# Patient Record
Sex: FEMALE | Race: AMERICAN INDIAN OR ALASKA NATIVE | HISPANIC OR LATINO | URBAN - METROPOLITAN AREA
[De-identification: names, ages, dates, MRNs, and addresses within clinical notes are randomized per-mention and may not be internally consistent; named-entity substitution may affect disease eponyms.]

---

## 2019-01-28 ENCOUNTER — OUTPATIENT (OUTPATIENT)
Dept: OUTPATIENT SERVICES | Facility: HOSPITAL | Age: 19
LOS: 1 days | End: 2019-01-28

## 2019-01-28 DIAGNOSIS — Z00.00 ENCOUNTER FOR GENERAL ADULT MEDICAL EXAMINATION WITHOUT ABNORMAL FINDINGS: ICD-10-CM

## 2019-07-25 ENCOUNTER — RESULT REVIEW (OUTPATIENT)
Age: 19
End: 2019-07-25

## 2020-04-26 ENCOUNTER — MESSAGE (OUTPATIENT)
Age: 20
End: 2020-04-26

## 2020-05-22 ENCOUNTER — APPOINTMENT (OUTPATIENT)
Dept: DISASTER EMERGENCY | Facility: CLINIC | Age: 20
End: 2020-05-22

## 2020-10-16 ENCOUNTER — RESULT REVIEW (OUTPATIENT)
Age: 20
End: 2020-10-16

## 2021-10-11 ENCOUNTER — RESULT REVIEW (OUTPATIENT)
Age: 21
End: 2021-10-11

## 2022-02-08 ENCOUNTER — NON-APPOINTMENT (OUTPATIENT)
Age: 22
End: 2022-02-08

## 2022-02-08 ENCOUNTER — APPOINTMENT (OUTPATIENT)
Dept: FAMILY MEDICINE | Facility: CLINIC | Age: 22
End: 2022-02-08
Payer: COMMERCIAL

## 2022-02-08 VITALS
WEIGHT: 220 LBS | SYSTOLIC BLOOD PRESSURE: 112 MMHG | DIASTOLIC BLOOD PRESSURE: 71 MMHG | HEIGHT: 63 IN | BODY MASS INDEX: 38.98 KG/M2 | OXYGEN SATURATION: 97 % | HEART RATE: 100 BPM

## 2022-02-08 DIAGNOSIS — Z78.9 OTHER SPECIFIED HEALTH STATUS: ICD-10-CM

## 2022-02-08 DIAGNOSIS — Z13.220 ENCOUNTER FOR SCREENING FOR LIPOID DISORDERS: ICD-10-CM

## 2022-02-08 DIAGNOSIS — Z87.898 PERSONAL HISTORY OF OTHER SPECIFIED CONDITIONS: ICD-10-CM

## 2022-02-08 DIAGNOSIS — Z11.3 ENCOUNTER FOR SCREENING FOR INFECTIONS WITH A PREDOMINANTLY SEXUAL MODE OF TRANSMISSION: ICD-10-CM

## 2022-02-08 DIAGNOSIS — Z13.29 ENCOUNTER FOR SCREENING FOR OTHER SUSPECTED ENDOCRINE DISORDER: ICD-10-CM

## 2022-02-08 DIAGNOSIS — Z13.1 ENCOUNTER FOR SCREENING FOR DIABETES MELLITUS: ICD-10-CM

## 2022-02-08 DIAGNOSIS — Z13.21 ENCOUNTER FOR SCREENING FOR NUTRITIONAL DISORDER: ICD-10-CM

## 2022-02-08 DIAGNOSIS — Z82.49 FAMILY HISTORY OF ISCHEMIC HEART DISEASE AND OTHER DISEASES OF THE CIRCULATORY SYSTEM: ICD-10-CM

## 2022-02-08 PROCEDURE — 99385 PREV VISIT NEW AGE 18-39: CPT

## 2022-02-10 LAB
BASOPHILS # BLD AUTO: 0.04 K/UL
BASOPHILS NFR BLD AUTO: 0.5 %
EOSINOPHIL # BLD AUTO: 0.11 K/UL
EOSINOPHIL NFR BLD AUTO: 1.3 %
HCT VFR BLD CALC: 43.9 %
HGB BLD-MCNC: 14.3 G/DL
IMM GRANULOCYTES NFR BLD AUTO: 0.2 %
LYMPHOCYTES # BLD AUTO: 2.45 K/UL
LYMPHOCYTES NFR BLD AUTO: 30 %
MAN DIFF?: NORMAL
MCHC RBC-ENTMCNC: 28.7 PG
MCHC RBC-ENTMCNC: 32.6 GM/DL
MCV RBC AUTO: 88.2 FL
MONOCYTES # BLD AUTO: 0.51 K/UL
MONOCYTES NFR BLD AUTO: 6.2 %
NEUTROPHILS # BLD AUTO: 5.05 K/UL
NEUTROPHILS NFR BLD AUTO: 61.8 %
PLATELET # BLD AUTO: 330 K/UL
RBC # BLD: 4.98 M/UL
RBC # FLD: 13 %
WBC # FLD AUTO: 8.18 K/UL

## 2022-02-11 ENCOUNTER — NON-APPOINTMENT (OUTPATIENT)
Age: 22
End: 2022-02-11

## 2022-02-11 ENCOUNTER — APPOINTMENT (OUTPATIENT)
Dept: BARIATRICS | Facility: CLINIC | Age: 22
End: 2022-02-11
Payer: COMMERCIAL

## 2022-02-11 ENCOUNTER — TRANSCRIPTION ENCOUNTER (OUTPATIENT)
Age: 22
End: 2022-02-11

## 2022-02-11 VITALS
OXYGEN SATURATION: 98 % | BODY MASS INDEX: 37.56 KG/M2 | DIASTOLIC BLOOD PRESSURE: 75 MMHG | TEMPERATURE: 96.4 F | WEIGHT: 220 LBS | SYSTOLIC BLOOD PRESSURE: 122 MMHG | HEIGHT: 64 IN | HEART RATE: 73 BPM

## 2022-02-11 DIAGNOSIS — E55.9 VITAMIN D DEFICIENCY, UNSPECIFIED: ICD-10-CM

## 2022-02-11 LAB
25(OH)D3 SERPL-MCNC: 15.4 NG/ML
ALBUMIN SERPL ELPH-MCNC: 4.7 G/DL
ALP BLD-CCNC: 73 U/L
ALT SERPL-CCNC: 11 U/L
ANION GAP SERPL CALC-SCNC: 14 MMOL/L
APPEARANCE: ABNORMAL
AST SERPL-CCNC: 14 U/L
BACTERIA: ABNORMAL
BILIRUB SERPL-MCNC: <0.2 MG/DL
BILIRUBIN URINE: NEGATIVE
BLOOD URINE: ABNORMAL
BUN SERPL-MCNC: 11 MG/DL
C TRACH RRNA SPEC QL NAA+PROBE: NOT DETECTED
CALCIUM SERPL-MCNC: 9.6 MG/DL
CHLORIDE SERPL-SCNC: 104 MMOL/L
CHOLEST SERPL-MCNC: 160 MG/DL
CO2 SERPL-SCNC: 22 MMOL/L
COLOR: YELLOW
CREAT SERPL-MCNC: 0.66 MG/DL
ESTIMATED AVERAGE GLUCOSE: 108 MG/DL
FOLATE SERPL-MCNC: 12.5 NG/ML
GLUCOSE QUALITATIVE U: NEGATIVE
GLUCOSE SERPL-MCNC: 98 MG/DL
HAV IGM SER QL: NONREACTIVE
HBA1C MFR BLD HPLC: 5.4 %
HBV CORE IGM SER QL: NONREACTIVE
HBV SURFACE AG SER QL: NONREACTIVE
HCV AB SER QL: NONREACTIVE
HCV S/CO RATIO: 0.15 S/CO
HDLC SERPL-MCNC: 55 MG/DL
HIV1+2 AB SPEC QL IA.RAPID: NONREACTIVE
HYALINE CASTS: 0 /LPF
KETONES URINE: NEGATIVE
LDLC SERPL CALC-MCNC: 88 MG/DL
LEUKOCYTE ESTERASE URINE: ABNORMAL
MICROSCOPIC-UA: NORMAL
N GONORRHOEA RRNA SPEC QL NAA+PROBE: NOT DETECTED
NITRITE URINE: NEGATIVE
NONHDLC SERPL-MCNC: 105 MG/DL
PH URINE: 5.5
POTASSIUM SERPL-SCNC: 4.5 MMOL/L
PROT SERPL-MCNC: 8 G/DL
PROTEIN URINE: NORMAL
RED BLOOD CELLS URINE: 5 /HPF
SODIUM SERPL-SCNC: 140 MMOL/L
SOURCE AMPLIFICATION: NORMAL
SPECIFIC GRAVITY URINE: 1.03
SQUAMOUS EPITHELIAL CELLS: >27 /HPF
T PALLIDUM AB SER QL IA: NEGATIVE
T4 FREE SERPL-MCNC: 1.4 NG/DL
TRIGL SERPL-MCNC: 88 MG/DL
TSH SERPL-ACNC: 2.56 UIU/ML
UROBILINOGEN URINE: NORMAL
VIT B12 SERPL-MCNC: 340 PG/ML
WHITE BLOOD CELLS URINE: 12 /HPF

## 2022-02-11 PROCEDURE — 99204 OFFICE O/P NEW MOD 45 MIN: CPT

## 2022-02-11 NOTE — ASSESSMENT
[FreeTextEntry1] : Patient is a 22 yo F with a history of morbid obesity and a BMI of 39 presenting for initial consultation for weight loss surgery. Patent has no PMH and no PSH. No GERD. \par \par We discussed at length surgical and non-surgical options and that non surgical approaches are unlikely to lead to long term, sustained weight loss. We also discussed that surgery alone is unlikely to be successful but should rather be seen as a tool for weight loss to be integrated with physical activity and nutritional counseling. The patient verbalized understanding and agrees to proceed with the evaluation. All risks of surgery were explained to the patient including the risks of leaks, infections, blood clots and death.\par

## 2022-02-11 NOTE — HISTORY OF PRESENT ILLNESS
[de-identified] : Patient is a 22 yo F with a history of morbid obesity and a BMI of 39 presenting for initial consultation for weight loss surgery. Patent has no PMH and no PSH. No GERD. \par \par We discussed at length surgical and non-surgical options and that non surgical approaches are unlikely to lead to long term, sustained weight loss. We also discussed that surgery alone is unlikely to be successful but should rather be seen as a tool for weight loss to be integrated with physical activity and nutritional counseling. The patient verbalized understanding and agrees to proceed with the evaluation. All risks of surgery were explained to the patient including the risks of leaks, infections, blood clots and death.\par

## 2022-02-18 ENCOUNTER — LABORATORY RESULT (OUTPATIENT)
Age: 22
End: 2022-02-18

## 2022-02-18 LAB
25(OH)D3 SERPL-MCNC: 25.6 NG/ML
ALBUMIN SERPL ELPH-MCNC: 4.5 G/DL
ALP BLD-CCNC: 70 U/L
ALT SERPL-CCNC: 10 U/L
ANION GAP SERPL CALC-SCNC: 15 MMOL/L
APPEARANCE: CLEAR
APTT BLD: 33.7 SEC
AST SERPL-CCNC: 12 U/L
BACTERIA: NEGATIVE
BASOPHILS # BLD AUTO: 0.03 K/UL
BASOPHILS NFR BLD AUTO: 0.5 %
BILIRUB SERPL-MCNC: <0.2 MG/DL
BILIRUBIN URINE: NEGATIVE
BLOOD URINE: NORMAL
BUN SERPL-MCNC: 10 MG/DL
CA-I SERPL-SCNC: 1.2 MMOL/L
CALCIUM SERPL-MCNC: 9.5 MG/DL
CALCIUM SERPL-MCNC: 9.5 MG/DL
CHLORIDE SERPL-SCNC: 103 MMOL/L
CHOLEST SERPL-MCNC: 187 MG/DL
CO2 SERPL-SCNC: 19 MMOL/L
COLOR: YELLOW
CREAT SERPL-MCNC: 0.66 MG/DL
EOSINOPHIL # BLD AUTO: 0.06 K/UL
EOSINOPHIL NFR BLD AUTO: 1 %
FOLATE SERPL-MCNC: 13 NG/ML
GLUCOSE QUALITATIVE U: NEGATIVE
GLUCOSE SERPL-MCNC: 107 MG/DL
HCG SERPL QL: NEGATIVE
HCT VFR BLD CALC: 42.7 %
HDLC SERPL-MCNC: 56 MG/DL
HGB BLD-MCNC: 13.9 G/DL
HYALINE CASTS: 2 /LPF
IMM GRANULOCYTES NFR BLD AUTO: 0.2 %
INR PPP: 1.04 RATIO
IRON SATN MFR SERPL: 13 %
IRON SERPL-MCNC: 52 UG/DL
KETONES URINE: NEGATIVE
LDLC SERPL CALC-MCNC: 118 MG/DL
LEUKOCYTE ESTERASE URINE: NEGATIVE
LYMPHOCYTES # BLD AUTO: 1.7 K/UL
LYMPHOCYTES NFR BLD AUTO: 27.1 %
MAN DIFF?: NORMAL
MCHC RBC-ENTMCNC: 28.9 PG
MCHC RBC-ENTMCNC: 32.6 GM/DL
MCV RBC AUTO: 88.8 FL
MICROSCOPIC-UA: NORMAL
MONOCYTES # BLD AUTO: 0.4 K/UL
MONOCYTES NFR BLD AUTO: 6.4 %
NEUTROPHILS # BLD AUTO: 4.08 K/UL
NEUTROPHILS NFR BLD AUTO: 64.8 %
NITRITE URINE: NEGATIVE
NONHDLC SERPL-MCNC: 130 MG/DL
PAPP-A SERPL-ACNC: <1 MIU/ML
PARATHYROID HORMONE INTACT: 49 PG/ML
PH URINE: 5.5
PLATELET # BLD AUTO: 371 K/UL
POTASSIUM SERPL-SCNC: 4.3 MMOL/L
PREALB SERPL NEPH-MCNC: 28 MG/DL
PROT SERPL-MCNC: 7.8 G/DL
PROTEIN URINE: NORMAL
PT BLD: 12.2 SEC
RBC # BLD: 4.81 M/UL
RBC # FLD: 13.5 %
RED BLOOD CELLS URINE: 4 /HPF
SODIUM SERPL-SCNC: 138 MMOL/L
SPECIFIC GRAVITY URINE: 1.03
SQUAMOUS EPITHELIAL CELLS: 10 /HPF
TIBC SERPL-MCNC: 404 UG/DL
TRIGL SERPL-MCNC: 63 MG/DL
TSH SERPL-ACNC: 2.26 UIU/ML
UIBC SERPL-MCNC: 352 UG/DL
UROBILINOGEN URINE: NORMAL
VIT B12 SERPL-MCNC: 290 PG/ML
WBC # FLD AUTO: 6.28 K/UL
WHITE BLOOD CELLS URINE: 3 /HPF

## 2022-02-23 LAB — ZINC SERPL-MCNC: 89 UG/DL

## 2022-02-24 LAB
A-TOCOPHEROL VIT E SERPL-MCNC: 5.9 MG/L
BETA+GAMMA TOCOPHEROL SERPL-MCNC: 1.2 MG/L

## 2022-02-25 LAB — VIT A SERPL-MCNC: 63.2 UG/DL

## 2022-03-01 ENCOUNTER — APPOINTMENT (OUTPATIENT)
Dept: INTERNAL MEDICINE | Facility: CLINIC | Age: 22
End: 2022-03-01

## 2022-03-02 LAB — VIT B1 SERPL-MCNC: 119.7 NMOL/L

## 2022-03-07 ENCOUNTER — APPOINTMENT (OUTPATIENT)
Dept: BARIATRICS | Facility: CLINIC | Age: 22
End: 2022-03-07
Payer: COMMERCIAL

## 2022-03-07 PROCEDURE — 98968 PH1 ASSMT&MGMT NQHP 21-30: CPT

## 2022-03-18 ENCOUNTER — TRANSCRIPTION ENCOUNTER (OUTPATIENT)
Age: 22
End: 2022-03-18

## 2022-03-18 RX ORDER — ERGOCALCIFEROL (VITAMIN D2) 50 MCG
50 MCG CAPSULE ORAL
Qty: 90 | Refills: 3 | Status: ACTIVE | COMMUNITY
Start: 2022-02-11 | End: 1900-01-01

## 2022-03-20 ENCOUNTER — NON-APPOINTMENT (OUTPATIENT)
Age: 22
End: 2022-03-20

## 2022-03-21 ENCOUNTER — TRANSCRIPTION ENCOUNTER (OUTPATIENT)
Age: 22
End: 2022-03-21

## 2022-03-21 LAB
APPEARANCE: ABNORMAL
BACTERIA UR CULT: NORMAL
BACTERIA: ABNORMAL
BILIRUBIN URINE: NEGATIVE
BLOOD URINE: NORMAL
COLOR: YELLOW
GLUCOSE QUALITATIVE U: NEGATIVE
HYALINE CASTS: 0 /LPF
KETONES URINE: NEGATIVE
LEUKOCYTE ESTERASE URINE: ABNORMAL
MICROSCOPIC-UA: NORMAL
NITRITE URINE: NEGATIVE
PH URINE: 5.5
PROTEIN URINE: NORMAL
RED BLOOD CELLS URINE: 1 /HPF
SPECIFIC GRAVITY URINE: 1.03
SQUAMOUS EPITHELIAL CELLS: 22 /HPF
UROBILINOGEN URINE: NORMAL
WHITE BLOOD CELLS URINE: 5 /HPF

## 2022-03-22 ENCOUNTER — NON-APPOINTMENT (OUTPATIENT)
Age: 22
End: 2022-03-22

## 2022-03-22 ENCOUNTER — TRANSCRIPTION ENCOUNTER (OUTPATIENT)
Age: 22
End: 2022-03-22

## 2022-03-23 ENCOUNTER — APPOINTMENT (OUTPATIENT)
Dept: FAMILY MEDICINE | Facility: CLINIC | Age: 22
End: 2022-03-23
Payer: COMMERCIAL

## 2022-03-23 DIAGNOSIS — R82.90 UNSPECIFIED ABNORMAL FINDINGS IN URINE: ICD-10-CM

## 2022-03-23 PROCEDURE — 99214 OFFICE O/P EST MOD 30 MIN: CPT | Mod: 95

## 2022-03-23 NOTE — HISTORY OF PRESENT ILLNESS
[Home] : at home, [unfilled] , at the time of the visit. [Other Location: e.g. Home (Enter Location, City,State)___] : at [unfilled] [Verbal consent obtained from patient] : the patient, [unfilled] [de-identified] : 22 yo female here for acute TEB. Has been working with bariatric surgery. Is interested in moving forward with surgery due to some exertional SOB due to deconditioning and difficulty exercising. Has appt with Pulm in 2 weeks for testing and sleep study eval. Will also be meeting with psychiatrist as well. Already working with nutritionist since the beginning of the month. WIll be seeing her monthly. Was advised to try to workout 30 min daily, cut out juice and sugary drinks. Has been cutting down on snacking and eating late at night. Has been working on eating 3 meals daily. \par \par Also had recent urinalysis with bacteria and wbcs. No urinary burning or frequency. Didn't use wipe when giving sample.

## 2022-03-31 VITALS — BODY MASS INDEX: 38.1 KG/M2 | HEIGHT: 64 IN | WEIGHT: 223.2 LBS

## 2022-04-04 ENCOUNTER — APPOINTMENT (OUTPATIENT)
Dept: BARIATRICS | Facility: CLINIC | Age: 22
End: 2022-04-04

## 2022-04-05 ENCOUNTER — TRANSCRIPTION ENCOUNTER (OUTPATIENT)
Age: 22
End: 2022-04-05

## 2022-04-06 ENCOUNTER — APPOINTMENT (OUTPATIENT)
Dept: BARIATRICS | Facility: CLINIC | Age: 22
End: 2022-04-06
Payer: COMMERCIAL

## 2022-04-06 ENCOUNTER — APPOINTMENT (OUTPATIENT)
Dept: PULMONOLOGY | Facility: CLINIC | Age: 22
End: 2022-04-06
Payer: COMMERCIAL

## 2022-04-06 VITALS
DIASTOLIC BLOOD PRESSURE: 74 MMHG | OXYGEN SATURATION: 98 % | SYSTOLIC BLOOD PRESSURE: 112 MMHG | WEIGHT: 223 LBS | HEIGHT: 64 IN | HEART RATE: 88 BPM | TEMPERATURE: 97.3 F | BODY MASS INDEX: 38.07 KG/M2

## 2022-04-06 VITALS
BODY MASS INDEX: 38.07 KG/M2 | WEIGHT: 223 LBS | TEMPERATURE: 98.1 F | OXYGEN SATURATION: 96 % | SYSTOLIC BLOOD PRESSURE: 131 MMHG | HEART RATE: 102 BPM | DIASTOLIC BLOOD PRESSURE: 79 MMHG | HEIGHT: 64 IN

## 2022-04-06 DIAGNOSIS — Z01.811 ENCOUNTER FOR PREPROCEDURAL RESPIRATORY EXAMINATION: ICD-10-CM

## 2022-04-06 DIAGNOSIS — J45.20 MILD INTERMITTENT ASTHMA, UNCOMPLICATED: ICD-10-CM

## 2022-04-06 PROCEDURE — 71046 X-RAY EXAM CHEST 2 VIEWS: CPT

## 2022-04-06 PROCEDURE — 99213 OFFICE O/P EST LOW 20 MIN: CPT

## 2022-04-06 PROCEDURE — 99204 OFFICE O/P NEW MOD 45 MIN: CPT | Mod: 25

## 2022-04-06 NOTE — HISTORY OF PRESENT ILLNESS
[Never] : never [Current] : current [TextBox_4] : she was an asthmatic as a child but she di dnot have any attacks since shhe was a child.  She is scheduled for bariatric surgery.  She is sob going up stairs.

## 2022-04-06 NOTE — PROCEDURE
[FreeTextEntry1] : CXR Good effort\par \par PA and lateral\par \par Pleural space normal\par \par Chest wall normal\par \par Cardiac silhouette normal\par \par Parenchymal abnormality none\par \par Normal chest x-ray

## 2022-04-07 NOTE — HISTORY OF PRESENT ILLNESS
[de-identified] : Ms. Smallwood presents today for an initial consultation for bariatric surgery. Briefly, she is a 21 year old female with history of asthma, denies any surgical history. Denies GERD symptoms. She has struggled with her weight all her life, trying multiple dietary regimens with no success. We discussed the different procedures, alternatives, risks and benefits. We discussed bariatric surgery being a tool to be used in addition to dietary changes and physical activity to optimize health and weight loss.

## 2022-04-07 NOTE — PHYSICAL EXAM
[Obese, well nourished, in no acute distress] : obese, well nourished, in no acute distress [Normal] : affect appropriate [de-identified] : no incisions

## 2022-04-07 NOTE — ASSESSMENT
[FreeTextEntry1] : 21 year old female presenting for an initial consultation for bariatric surgery. Patient meets criteria for bariatric surgery, interested in moving forward with laparoscopic sleeve gastrectomy. Will have her begin working with nutrition for counseling and education.

## 2022-04-08 ENCOUNTER — OUTPATIENT (OUTPATIENT)
Dept: OUTPATIENT SERVICES | Facility: HOSPITAL | Age: 22
LOS: 1 days | End: 2022-04-08
Payer: COMMERCIAL

## 2022-04-08 ENCOUNTER — APPOINTMENT (OUTPATIENT)
Dept: ULTRASOUND IMAGING | Facility: HOSPITAL | Age: 22
End: 2022-04-08
Payer: COMMERCIAL

## 2022-04-08 PROCEDURE — 76700 US EXAM ABDOM COMPLETE: CPT | Mod: 26

## 2022-04-08 PROCEDURE — 76700 US EXAM ABDOM COMPLETE: CPT

## 2022-04-11 ENCOUNTER — APPOINTMENT (OUTPATIENT)
Dept: BARIATRICS | Facility: CLINIC | Age: 22
End: 2022-04-11
Payer: COMMERCIAL

## 2022-04-11 PROCEDURE — 97803 MED NUTRITION INDIV SUBSEQ: CPT | Mod: 95

## 2022-04-17 ENCOUNTER — APPOINTMENT (OUTPATIENT)
Dept: SLEEP CENTER | Facility: HOME HEALTH | Age: 22
End: 2022-04-17
Payer: COMMERCIAL

## 2022-04-17 ENCOUNTER — OUTPATIENT (OUTPATIENT)
Dept: OUTPATIENT SERVICES | Facility: HOSPITAL | Age: 22
LOS: 1 days | End: 2022-04-17
Payer: COMMERCIAL

## 2022-04-17 DIAGNOSIS — G47.33 OBSTRUCTIVE SLEEP APNEA (ADULT) (PEDIATRIC): ICD-10-CM

## 2022-04-17 PROCEDURE — 95800 SLP STDY UNATTENDED: CPT

## 2022-04-17 PROCEDURE — 95800 SLP STDY UNATTENDED: CPT | Mod: 26

## 2022-04-18 ENCOUNTER — TRANSCRIPTION ENCOUNTER (OUTPATIENT)
Age: 22
End: 2022-04-18

## 2022-04-18 DIAGNOSIS — G47.33 OBSTRUCTIVE SLEEP APNEA (ADULT) (PEDIATRIC): ICD-10-CM

## 2022-04-18 LAB
APPEARANCE: CLEAR
BACTERIA UR CULT: NORMAL
BACTERIA: NEGATIVE
BILIRUBIN URINE: NEGATIVE
BLOOD URINE: NEGATIVE
COLOR: NORMAL
GLUCOSE QUALITATIVE U: NEGATIVE
HYALINE CASTS: 0 /LPF
KETONES URINE: NEGATIVE
LEUKOCYTE ESTERASE URINE: NEGATIVE
MICROSCOPIC-UA: NORMAL
NITRITE URINE: NEGATIVE
PH URINE: 6
PROTEIN URINE: NEGATIVE
RED BLOOD CELLS URINE: 1 /HPF
SPECIFIC GRAVITY URINE: 1.02
SQUAMOUS EPITHELIAL CELLS: 5 /HPF
UROBILINOGEN URINE: NORMAL
WHITE BLOOD CELLS URINE: 2 /HPF

## 2022-04-21 ENCOUNTER — TRANSCRIPTION ENCOUNTER (OUTPATIENT)
Age: 22
End: 2022-04-21

## 2022-04-25 ENCOUNTER — APPOINTMENT (OUTPATIENT)
Dept: PULMONOLOGY | Facility: CLINIC | Age: 22
End: 2022-04-25

## 2022-04-25 DIAGNOSIS — G47.33 OBSTRUCTIVE SLEEP APNEA (ADULT) (PEDIATRIC): ICD-10-CM

## 2022-04-26 ENCOUNTER — NON-APPOINTMENT (OUTPATIENT)
Age: 22
End: 2022-04-26

## 2022-04-26 ENCOUNTER — APPOINTMENT (OUTPATIENT)
Dept: FAMILY MEDICINE | Facility: CLINIC | Age: 22
End: 2022-04-26
Payer: COMMERCIAL

## 2022-04-26 ENCOUNTER — TRANSCRIPTION ENCOUNTER (OUTPATIENT)
Age: 22
End: 2022-04-26

## 2022-04-26 ENCOUNTER — LABORATORY RESULT (OUTPATIENT)
Age: 22
End: 2022-04-26

## 2022-04-26 VITALS
SYSTOLIC BLOOD PRESSURE: 125 MMHG | HEART RATE: 76 BPM | OXYGEN SATURATION: 99 % | HEIGHT: 64 IN | WEIGHT: 224 LBS | BODY MASS INDEX: 38.24 KG/M2 | TEMPERATURE: 97.4 F | DIASTOLIC BLOOD PRESSURE: 74 MMHG

## 2022-04-26 DIAGNOSIS — Z01.818 ENCOUNTER FOR OTHER PREPROCEDURAL EXAMINATION: ICD-10-CM

## 2022-04-26 PROCEDURE — 36415 COLL VENOUS BLD VENIPUNCTURE: CPT

## 2022-04-26 PROCEDURE — 93000 ELECTROCARDIOGRAM COMPLETE: CPT

## 2022-04-26 PROCEDURE — 99214 OFFICE O/P EST MOD 30 MIN: CPT | Mod: 25

## 2022-04-27 ENCOUNTER — OUTPATIENT (OUTPATIENT)
Dept: OUTPATIENT SERVICES | Facility: HOSPITAL | Age: 22
LOS: 1 days | End: 2022-04-27
Payer: COMMERCIAL

## 2022-04-27 ENCOUNTER — TRANSCRIPTION ENCOUNTER (OUTPATIENT)
Age: 22
End: 2022-04-27

## 2022-04-27 LAB
ALBUMIN SERPL ELPH-MCNC: 4.4 G/DL
ALP BLD-CCNC: 82 U/L
ALT SERPL-CCNC: 11 U/L
ANION GAP SERPL CALC-SCNC: 13 MMOL/L
APPEARANCE: CLEAR
APTT BLD: 33.6 SEC
AST SERPL-CCNC: 14 U/L
BASOPHILS # BLD AUTO: 0.05 K/UL
BASOPHILS NFR BLD AUTO: 0.6 %
BILIRUB SERPL-MCNC: 0.2 MG/DL
BILIRUBIN URINE: NEGATIVE
BLOOD URINE: NEGATIVE
BUN SERPL-MCNC: 10 MG/DL
CALCIUM SERPL-MCNC: 9.5 MG/DL
CHLORIDE SERPL-SCNC: 102 MMOL/L
CO2 SERPL-SCNC: 22 MMOL/L
COLOR: YELLOW
CREAT SERPL-MCNC: 0.72 MG/DL
EGFR: 120 ML/MIN/1.73M2
EOSINOPHIL # BLD AUTO: 0.06 K/UL
EOSINOPHIL NFR BLD AUTO: 0.7 %
GLUCOSE QUALITATIVE U: NEGATIVE
GLUCOSE SERPL-MCNC: 69 MG/DL
HCG SERPL QL: NEGATIVE
HCT VFR BLD CALC: 42.4 %
HGB BLD-MCNC: 13.4 G/DL
IMM GRANULOCYTES NFR BLD AUTO: 0.2 %
INR PPP: 1.12 RATIO
KETONES URINE: NORMAL
LEUKOCYTE ESTERASE URINE: ABNORMAL
LYMPHOCYTES # BLD AUTO: 2.57 K/UL
LYMPHOCYTES NFR BLD AUTO: 29.2 %
MAN DIFF?: NORMAL
MCHC RBC-ENTMCNC: 28.5 PG
MCHC RBC-ENTMCNC: 31.6 GM/DL
MCV RBC AUTO: 90.2 FL
MONOCYTES # BLD AUTO: 0.46 K/UL
MONOCYTES NFR BLD AUTO: 5.2 %
NEUTROPHILS # BLD AUTO: 5.63 K/UL
NEUTROPHILS NFR BLD AUTO: 64.1 %
NITRITE URINE: NEGATIVE
PAPP-A SERPL-ACNC: <1 MIU/ML
PH URINE: 6
PLATELET # BLD AUTO: 376 K/UL
POTASSIUM SERPL-SCNC: 4.4 MMOL/L
PROT SERPL-MCNC: 7.7 G/DL
PROTEIN URINE: NORMAL
PT BLD: 13.2 SEC
RBC # BLD: 4.7 M/UL
RBC # FLD: 13.1 %
SODIUM SERPL-SCNC: 138 MMOL/L
SPECIFIC GRAVITY URINE: 1.03
UROBILINOGEN URINE: NORMAL
WBC # FLD AUTO: 8.79 K/UL

## 2022-04-27 PROCEDURE — 71046 X-RAY EXAM CHEST 2 VIEWS: CPT | Mod: 26

## 2022-04-27 PROCEDURE — 71046 X-RAY EXAM CHEST 2 VIEWS: CPT

## 2022-04-27 NOTE — HISTORY OF PRESENT ILLNESS
[No Pertinent Cardiac History] : no history of aortic stenosis, atrial fibrillation, coronary artery disease, recent myocardial infarction, or implantable device/pacemaker [Asthma] : asthma [Sleep Apnea] : sleep apnea [No Adverse Anesthesia Reaction] : no adverse anesthesia reaction in self or family member [(Patient denies any chest pain, claudication, dyspnea on exertion, orthopnea, palpitations or syncope)] : Patient denies any chest pain, claudication, dyspnea on exertion, orthopnea, palpitations or syncope [COPD] : no COPD [Smoker] : not a smoker [Chronic Anticoagulation] : no chronic anticoagulation [Chronic Kidney Disease] : no chronic kidney disease [Diabetes] : no diabetes [FreeTextEntry1] : Bariatric sleeve [FreeTextEntry2] : 5/16/22 [FreeTextEntry3] : Dr Hughes [FreeTextEntry4] : 22 yo female here for preop for bariatric surgery. Feeling well today, no complaints.

## 2022-04-27 NOTE — ASSESSMENT
[High Risk Surgery - Intraperitoneal, Intrathoracic or Supringuinal Vascular Procedures] : High Risk Surgery - Intraperitoneal, Intrathoracic or Supringuinal Vascular Procedures - No (0) [Ischemic Heart Disease] : Ischemic Heart Disease - No (0) [Congestive Heart Failure] : Congestive Heart Failure - No (0) [Prior Cerebrovascular Accident or TIA] : Prior Cerebrovascular Accident or TIA - No (0) [Creatinine >= 2mg/dL (1 Point)] : Creatinine >= 2mg/dL - No (0) [Insulin-dependent Diabetic (1 Point)] : Insulin-dependent Diabetic - No (0) [0] : 0 , RCRI Class: I, Risk of Post-Op Cardiac Complications: 3.9%, 95% CI for Risk Estimate: 2.8% - 5.4% [Patient Optimized for Surgery] : Patient optimized for surgery [FreeTextEntry4] : 22 yo female here for preop for bariatric surgery. Feeling well today, no complaints. Patient low risk. Medically optimized at this time.

## 2022-04-28 ENCOUNTER — TRANSCRIPTION ENCOUNTER (OUTPATIENT)
Age: 22
End: 2022-04-28

## 2022-05-02 ENCOUNTER — APPOINTMENT (OUTPATIENT)
Dept: RADIOLOGY | Facility: HOSPITAL | Age: 22
End: 2022-05-02
Payer: COMMERCIAL

## 2022-05-02 ENCOUNTER — OUTPATIENT (OUTPATIENT)
Dept: OUTPATIENT SERVICES | Facility: HOSPITAL | Age: 22
LOS: 1 days | End: 2022-05-02
Payer: COMMERCIAL

## 2022-05-02 PROCEDURE — 74240 X-RAY XM UPR GI TRC 1CNTRST: CPT | Mod: 26

## 2022-05-02 PROCEDURE — 74240 X-RAY XM UPR GI TRC 1CNTRST: CPT

## 2022-05-12 ENCOUNTER — LABORATORY RESULT (OUTPATIENT)
Age: 22
End: 2022-05-12

## 2022-05-13 NOTE — PATIENT PROFILE ADULT - FALL HARM RISK - HARM RISK INTERVENTIONS

## 2022-05-15 ENCOUNTER — TRANSCRIPTION ENCOUNTER (OUTPATIENT)
Age: 22
End: 2022-05-15

## 2022-05-16 ENCOUNTER — APPOINTMENT (OUTPATIENT)
Dept: BARIATRICS | Facility: HOSPITAL | Age: 22
End: 2022-05-16

## 2022-05-16 ENCOUNTER — RESULT REVIEW (OUTPATIENT)
Age: 22
End: 2022-05-16

## 2022-05-16 ENCOUNTER — INPATIENT (INPATIENT)
Facility: HOSPITAL | Age: 22
LOS: 0 days | Discharge: HOME | DRG: 621 | End: 2022-05-17
Attending: SURGERY | Admitting: SURGERY
Payer: COMMERCIAL

## 2022-05-16 VITALS
SYSTOLIC BLOOD PRESSURE: 133 MMHG | DIASTOLIC BLOOD PRESSURE: 85 MMHG | TEMPERATURE: 97 F | RESPIRATION RATE: 16 BRPM | OXYGEN SATURATION: 96 % | WEIGHT: 221.12 LBS | HEART RATE: 80 BPM | HEIGHT: 64 IN

## 2022-05-16 LAB
BLD GP AB SCN SERPL QL: NEGATIVE — SIGNIFICANT CHANGE UP
GLUCOSE BLDC GLUCOMTR-MCNC: 104 MG/DL — HIGH (ref 70–99)
HCT VFR BLD CALC: 39.1 % — SIGNIFICANT CHANGE UP (ref 34.5–45)
HGB BLD-MCNC: 13.1 G/DL — SIGNIFICANT CHANGE UP (ref 11.5–15.5)
MCHC RBC-ENTMCNC: 28.5 PG — SIGNIFICANT CHANGE UP (ref 27–34)
MCHC RBC-ENTMCNC: 33.5 GM/DL — SIGNIFICANT CHANGE UP (ref 32–36)
MCV RBC AUTO: 85.2 FL — SIGNIFICANT CHANGE UP (ref 80–100)
NRBC # BLD: 0 /100 WBCS — SIGNIFICANT CHANGE UP (ref 0–0)
PLATELET # BLD AUTO: 314 K/UL — SIGNIFICANT CHANGE UP (ref 150–400)
RBC # BLD: 4.59 M/UL — SIGNIFICANT CHANGE UP (ref 3.8–5.2)
RBC # FLD: 12.7 % — SIGNIFICANT CHANGE UP (ref 10.3–14.5)
RH IG SCN BLD-IMP: POSITIVE — SIGNIFICANT CHANGE UP
WBC # BLD: 8.56 K/UL — SIGNIFICANT CHANGE UP (ref 3.8–10.5)
WBC # FLD AUTO: 8.56 K/UL — SIGNIFICANT CHANGE UP (ref 3.8–10.5)

## 2022-05-16 PROCEDURE — 88307 TISSUE EXAM BY PATHOLOGIST: CPT | Mod: 26

## 2022-05-16 PROCEDURE — 43775 LAP SLEEVE GASTRECTOMY: CPT | Mod: GC

## 2022-05-16 DEVICE — XI STAPLER SUREFORM RELOAD 60 GREEN: Type: IMPLANTABLE DEVICE | Status: FUNCTIONAL

## 2022-05-16 DEVICE — XI STAPLER SUREFORM RELOAD 60 BLUE: Type: IMPLANTABLE DEVICE | Status: FUNCTIONAL

## 2022-05-16 RX ORDER — SCOPALAMINE 1 MG/3D
1 PATCH, EXTENDED RELEASE TRANSDERMAL ONCE
Refills: 0 | Status: COMPLETED | OUTPATIENT
Start: 2022-05-16 | End: 2022-05-16

## 2022-05-16 RX ORDER — ONDANSETRON 8 MG/1
4 TABLET, FILM COATED ORAL EVERY 6 HOURS
Refills: 0 | Status: DISCONTINUED | OUTPATIENT
Start: 2022-05-16 | End: 2022-05-17

## 2022-05-16 RX ORDER — APREPITANT 80 MG/1
40 CAPSULE ORAL ONCE
Refills: 0 | Status: COMPLETED | OUTPATIENT
Start: 2022-05-16 | End: 2022-05-16

## 2022-05-16 RX ORDER — ONDANSETRON 8 MG/1
4 TABLET, FILM COATED ORAL ONCE
Refills: 0 | Status: COMPLETED | OUTPATIENT
Start: 2022-05-16 | End: 2022-05-16

## 2022-05-16 RX ORDER — PANTOPRAZOLE SODIUM 20 MG/1
40 TABLET, DELAYED RELEASE ORAL DAILY
Refills: 0 | Status: DISCONTINUED | OUTPATIENT
Start: 2022-05-16 | End: 2022-05-17

## 2022-05-16 RX ORDER — SODIUM CHLORIDE 9 MG/ML
1000 INJECTION, SOLUTION INTRAVENOUS
Refills: 0 | Status: DISCONTINUED | OUTPATIENT
Start: 2022-05-16 | End: 2022-05-17

## 2022-05-16 RX ORDER — ACETAMINOPHEN 500 MG
1000 TABLET ORAL ONCE
Refills: 0 | Status: COMPLETED | OUTPATIENT
Start: 2022-05-16 | End: 2022-05-16

## 2022-05-16 RX ORDER — ENOXAPARIN SODIUM 100 MG/ML
40 INJECTION SUBCUTANEOUS ONCE
Refills: 0 | Status: COMPLETED | OUTPATIENT
Start: 2022-05-16 | End: 2022-05-16

## 2022-05-16 RX ORDER — ONDANSETRON 8 MG/1
4 TABLET, FILM COATED ORAL EVERY 6 HOURS
Refills: 0 | Status: DISCONTINUED | OUTPATIENT
Start: 2022-05-16 | End: 2022-05-16

## 2022-05-16 RX ADMIN — ONDANSETRON 4 MILLIGRAM(S): 8 TABLET, FILM COATED ORAL at 16:37

## 2022-05-16 RX ADMIN — Medication 1000 MILLIGRAM(S): at 09:02

## 2022-05-16 RX ADMIN — ONDANSETRON 4 MILLIGRAM(S): 8 TABLET, FILM COATED ORAL at 21:12

## 2022-05-16 RX ADMIN — APREPITANT 40 MILLIGRAM(S): 80 CAPSULE ORAL at 09:01

## 2022-05-16 RX ADMIN — SCOPALAMINE 1 PATCH: 1 PATCH, EXTENDED RELEASE TRANSDERMAL at 09:02

## 2022-05-16 RX ADMIN — ENOXAPARIN SODIUM 40 MILLIGRAM(S): 100 INJECTION SUBCUTANEOUS at 09:02

## 2022-05-16 RX ADMIN — PANTOPRAZOLE SODIUM 40 MILLIGRAM(S): 20 TABLET, DELAYED RELEASE ORAL at 14:43

## 2022-05-16 RX ADMIN — SCOPALAMINE 1 PATCH: 1 PATCH, EXTENDED RELEASE TRANSDERMAL at 18:48

## 2022-05-16 NOTE — H&P ADULT - ASSESSMENT
Patient is a 22 yo F with a history of morbid obesity and a BMI of 39 presenting for elective RA laparoscopic sleeve gastrectomy

## 2022-05-16 NOTE — BRIEF OPERATIVE NOTE - OPERATION/FINDINGS
Veress needle entry into the abdomen used to guide robotic ports into the badomen under direct visualization. Lesser sac entered. Ligasure ligation of short gastrics. Stomach divided over bougie, using stapler with oversewn suture closure to dissect and secure the stomach. The abdominal cavity was inspected. The stomach remnant was removed through an expanded port site incision with no gross spillage. Hemostasis achieved. Fascia closed with endoclose suture. Monocryl subcuticular closure of port sites and exit incision. See dictation.

## 2022-05-16 NOTE — H&P ADULT - NSHPREVIEWOFSYSTEMS_GEN_ALL_CORE
Constitutional, Eyes, ENT, Cardiovascular, Respiratory, Gastrointestinal, Genitourinary, Musculoskeletal, Integumentary, Neurological, Psychiatric, Endocrine, Heme/Lymph and Allergic/Immunologic review of systems are otherwise negative except as noted in HPI.

## 2022-05-16 NOTE — CHART NOTE - NSCHARTNOTEFT_GEN_A_CORE
POSTOPERATIVE CHECK    Patient resting comfortably in bed. Pain and nausea well controlled. No fevers, chills or vomiting.  Denies SOB, chest pain, or lower extremity pain. Incisions clean, dry and intact. Patient oxygenating well, vital signs within normal limits.    Vital Signs Last 24 Hrs  T(C): 36.6 (16 May 2022 14:24), Max: 36.6 (16 May 2022 14:24)  T(F): 97.9 (16 May 2022 14:24), Max: 97.9 (16 May 2022 14:24)  HR: 84 (16 May 2022 14:24) (65 - 84)  BP: 111/82 (16 May 2022 14:24) (102/56 - 133/85)  BP(mean): 81 (16 May 2022 13:43) (72 - 82)  RR: 18 (16 May 2022 14:24) (16 - 25)  SpO2: 97% (16 May 2022 14:24) (96% - 98%)  I&O's Detail    16 May 2022 07:01  -  16 May 2022 15:40  --------------------------------------------------------  IN:    Lactated Ringers: 600 mL  Total IN: 600 mL    OUT:  Total OUT: 0 mL    Total NET: 600 mL          PHYSICAL EXAM    General: NAD, resting comfortably in bed  C/V: NSR  Pulm: Nonlabored breathing, no respiratory distress on room air  Abd: soft, NTND, no rebound tenderness, no guarding  Extrem: WWP, no edema, SCDs in place        LABS:                RADIOLOGY & ADDITIONAL STUDIES:

## 2022-05-16 NOTE — H&P ADULT - HISTORY OF PRESENT ILLNESS
Patient is a 20 yo F with a history of morbid obesity and a BMI of 39 presenting for elective RA laparoscopic sleeve gastrectomy. Patent has no PMH and no PSH. No GERD.

## 2022-05-17 ENCOUNTER — NON-APPOINTMENT (OUTPATIENT)
Age: 22
End: 2022-05-17

## 2022-05-17 ENCOUNTER — TRANSCRIPTION ENCOUNTER (OUTPATIENT)
Age: 22
End: 2022-05-17

## 2022-05-17 VITALS — WEIGHT: 220.9 LBS

## 2022-05-17 PROBLEM — Z78.9 OTHER SPECIFIED HEALTH STATUS: Chronic | Status: ACTIVE | Noted: 2022-05-16

## 2022-05-17 LAB
ANION GAP SERPL CALC-SCNC: 13 MMOL/L — SIGNIFICANT CHANGE UP (ref 5–17)
BASOPHILS # BLD AUTO: 0.01 K/UL — SIGNIFICANT CHANGE UP (ref 0–0.2)
BASOPHILS NFR BLD AUTO: 0.1 % — SIGNIFICANT CHANGE UP (ref 0–2)
BUN SERPL-MCNC: 6 MG/DL — LOW (ref 7–23)
CALCIUM SERPL-MCNC: 9.2 MG/DL — SIGNIFICANT CHANGE UP (ref 8.4–10.5)
CHLORIDE SERPL-SCNC: 106 MMOL/L — SIGNIFICANT CHANGE UP (ref 96–108)
CO2 SERPL-SCNC: 20 MMOL/L — LOW (ref 22–31)
CREAT SERPL-MCNC: 0.58 MG/DL — SIGNIFICANT CHANGE UP (ref 0.5–1.3)
EGFR: 130 ML/MIN/1.73M2 — SIGNIFICANT CHANGE UP
EOSINOPHIL # BLD AUTO: 0 K/UL — SIGNIFICANT CHANGE UP (ref 0–0.5)
EOSINOPHIL NFR BLD AUTO: 0 % — SIGNIFICANT CHANGE UP (ref 0–6)
GLUCOSE SERPL-MCNC: 118 MG/DL — HIGH (ref 70–99)
HCT VFR BLD CALC: 35.8 % — SIGNIFICANT CHANGE UP (ref 34.5–45)
HGB BLD-MCNC: 12.2 G/DL — SIGNIFICANT CHANGE UP (ref 11.5–15.5)
IMM GRANULOCYTES NFR BLD AUTO: 0.3 % — SIGNIFICANT CHANGE UP (ref 0–1.5)
LYMPHOCYTES # BLD AUTO: 1.41 K/UL — SIGNIFICANT CHANGE UP (ref 1–3.3)
LYMPHOCYTES # BLD AUTO: 12 % — LOW (ref 13–44)
MAGNESIUM SERPL-MCNC: 1.8 MG/DL — SIGNIFICANT CHANGE UP (ref 1.6–2.6)
MCHC RBC-ENTMCNC: 29 PG — SIGNIFICANT CHANGE UP (ref 27–34)
MCHC RBC-ENTMCNC: 34.1 GM/DL — SIGNIFICANT CHANGE UP (ref 32–36)
MCV RBC AUTO: 85.2 FL — SIGNIFICANT CHANGE UP (ref 80–100)
MONOCYTES # BLD AUTO: 0.99 K/UL — HIGH (ref 0–0.9)
MONOCYTES NFR BLD AUTO: 8.5 % — SIGNIFICANT CHANGE UP (ref 2–14)
NEUTROPHILS # BLD AUTO: 9.26 K/UL — HIGH (ref 1.8–7.4)
NEUTROPHILS NFR BLD AUTO: 79.1 % — HIGH (ref 43–77)
NRBC # BLD: 0 /100 WBCS — SIGNIFICANT CHANGE UP (ref 0–0)
PHOSPHATE SERPL-MCNC: 2.8 MG/DL — SIGNIFICANT CHANGE UP (ref 2.5–4.5)
PLATELET # BLD AUTO: 309 K/UL — SIGNIFICANT CHANGE UP (ref 150–400)
POTASSIUM SERPL-MCNC: 3.7 MMOL/L — SIGNIFICANT CHANGE UP (ref 3.5–5.3)
POTASSIUM SERPL-SCNC: 3.7 MMOL/L — SIGNIFICANT CHANGE UP (ref 3.5–5.3)
RBC # BLD: 4.2 M/UL — SIGNIFICANT CHANGE UP (ref 3.8–5.2)
RBC # FLD: 12.9 % — SIGNIFICANT CHANGE UP (ref 10.3–14.5)
SODIUM SERPL-SCNC: 139 MMOL/L — SIGNIFICANT CHANGE UP (ref 135–145)
WBC # BLD: 11.71 K/UL — HIGH (ref 3.8–10.5)
WBC # FLD AUTO: 11.71 K/UL — HIGH (ref 3.8–10.5)

## 2022-05-17 PROCEDURE — 85025 COMPLETE CBC W/AUTO DIFF WBC: CPT

## 2022-05-17 PROCEDURE — 84100 ASSAY OF PHOSPHORUS: CPT

## 2022-05-17 PROCEDURE — 86901 BLOOD TYPING SEROLOGIC RH(D): CPT

## 2022-05-17 PROCEDURE — 85027 COMPLETE CBC AUTOMATED: CPT

## 2022-05-17 PROCEDURE — 36415 COLL VENOUS BLD VENIPUNCTURE: CPT

## 2022-05-17 PROCEDURE — 83735 ASSAY OF MAGNESIUM: CPT

## 2022-05-17 PROCEDURE — 88307 TISSUE EXAM BY PATHOLOGIST: CPT

## 2022-05-17 PROCEDURE — 86900 BLOOD TYPING SEROLOGIC ABO: CPT

## 2022-05-17 PROCEDURE — 80048 BASIC METABOLIC PNL TOTAL CA: CPT

## 2022-05-17 PROCEDURE — 86850 RBC ANTIBODY SCREEN: CPT

## 2022-05-17 PROCEDURE — S2900: CPT

## 2022-05-17 PROCEDURE — C1889: CPT

## 2022-05-17 PROCEDURE — 82962 GLUCOSE BLOOD TEST: CPT

## 2022-05-17 RX ORDER — APIXABAN 2.5 MG/1
1 TABLET, FILM COATED ORAL
Qty: 60 | Refills: 0
Start: 2022-05-17 | End: 2022-06-15

## 2022-05-17 RX ORDER — POTASSIUM CHLORIDE 20 MEQ
20 PACKET (EA) ORAL ONCE
Refills: 0 | Status: COMPLETED | OUTPATIENT
Start: 2022-05-17 | End: 2022-05-17

## 2022-05-17 RX ORDER — MAGNESIUM SULFATE 500 MG/ML
2 VIAL (ML) INJECTION ONCE
Refills: 0 | Status: COMPLETED | OUTPATIENT
Start: 2022-05-17 | End: 2022-05-17

## 2022-05-17 RX ORDER — HEPARIN SODIUM 5000 [USP'U]/ML
7500 INJECTION INTRAVENOUS; SUBCUTANEOUS EVERY 8 HOURS
Refills: 0 | Status: DISCONTINUED | OUTPATIENT
Start: 2022-05-17 | End: 2022-05-17

## 2022-05-17 RX ORDER — ACETAMINOPHEN 500 MG
2 TABLET ORAL
Qty: 32 | Refills: 0
Start: 2022-05-17 | End: 2022-05-20

## 2022-05-17 RX ORDER — OMEPRAZOLE 10 MG/1
1 CAPSULE, DELAYED RELEASE ORAL
Qty: 30 | Refills: 0
Start: 2022-05-17 | End: 2022-06-15

## 2022-05-17 RX ORDER — KETOROLAC TROMETHAMINE 30 MG/ML
15 SYRINGE (ML) INJECTION EVERY 6 HOURS
Refills: 0 | Status: DISCONTINUED | OUTPATIENT
Start: 2022-05-17 | End: 2022-05-17

## 2022-05-17 RX ADMIN — Medication 15 MILLIGRAM(S): at 11:38

## 2022-05-17 RX ADMIN — Medication 20 MILLIEQUIVALENT(S): at 07:33

## 2022-05-17 RX ADMIN — PANTOPRAZOLE SODIUM 40 MILLIGRAM(S): 20 TABLET, DELAYED RELEASE ORAL at 11:37

## 2022-05-17 RX ADMIN — SCOPALAMINE 1 PATCH: 1 PATCH, EXTENDED RELEASE TRANSDERMAL at 05:33

## 2022-05-17 RX ADMIN — Medication 25 GRAM(S): at 07:32

## 2022-05-17 NOTE — DIETITIAN INITIAL EVALUATION ADULT - NUTRITIONGOAL OUTCOME1
Pt to recall 2 main concepts from education (protein needs, fluid needs, vitamin and mineral needs)

## 2022-05-17 NOTE — DISCHARGE NOTE PROVIDER - HOSPITAL COURSE
21 year old female with history of morbid obesity (BMI 39) s/p robotic  laparoscopic sleeve gastrectomy. Patient tolerated the procedure well. At time of discharge, pt was tolerating a bariatric clear liquid diet, and pt's pain was controlled. Plan is to follow up with Dr. Hughes in the office.    1) Please take Tylenol 650 mg every 4 to 6 hours by mouth for moderate pain control. Please do not exceed over 4,000 mg of Tylenol a day.  2) Please start taking Eliquis 2.5 mg by mouth twice a day starting 3 days after surgery. Please start Thursday May 19, 2022 .  3) Please take Omeprazole 40 mg once a day by mouth.  as 21 year old female with history of morbid obesity (BMI 39) s/p robotic  laparoscopic sleeve gastrectomy. Patient tolerated the procedure well. At time of discharge, pt was tolerating a bariatric clear liquid diet, and pt's pain was controlled. Plan is to follow up with Dr. Hughes in the office.

## 2022-05-17 NOTE — DIETITIAN INITIAL EVALUATION ADULT - OTHER INFO
Patient is a 22 yo F with a history of morbid obesity and a BMI of 39 presenting for elective RA laparoscopic sleeve gastrectomy. Pt is now s/p LSG 5/16.    On assessment, pt resting in bed. Currently on BARICLLIQ diet, tolerating PO. Reports drinking 4 oz every hr since 6 AM this morning. Denies any issues with PO intake. Pain and nausea well controlled. Discussed volumes of various cup sizes on tray table and encouraged aiming for 4 oz/hr as tolerated. Prepared with protein shakes, with plan to get vitamins after discharge. RD provided indepth edu on diet advancement and specific nutrient needs s/p LSG. NKFA. No dietary restrictions at home. Skin: Michael 20, surgical incisions. GI: WDL per flowsheet. RD to follow up per protocol.

## 2022-05-17 NOTE — DISCHARGE NOTE PROVIDER - NSDCFUADDINST_GEN_ALL_CORE_FT
Follow up with  in 1 week. Call the office at  to schedule your appointment.  You may shower; soap and water over incision sites. Do not scrub. Pat dry when done. No tub bathing or swimming until cleared. Keep incision sites out of the sun as scars will darken. No heavy lifting (>10lbs) or strenuous exercise. Diet: Bariatric Full Fluids. 60 grams protein daily.  64 fluid ounces water daily. Drink small sips throughout the day. Continue diet as outlined by paperwork received as a pre-operative patient. You should be urinating at least 3-4x per day. Call the office if you experience increasing abdominal pain, nausea, vomiting, or temperature >100.4F.  NO ASPIRIN OR NSAIDs until approved by Dr. Hughes. Avoid alcoholic beverages until cleared by Dr. Hughes.    1) Please take Tylenol 650 mg every 4 to 6 hours by mouth for moderate pain control. Please do not exceed over 4,000 mg of Tylenol a day.  2) Please start taking Eliquis 2.5 mg by mouth twice a day starting 3 days after surgery. Please start Thursday May 19, 2022 .  3) Please take Omeprazole 40 mg once a day by mouth.

## 2022-05-17 NOTE — DISCHARGE NOTE PROVIDER - NSDCCPGOAL_GEN_ALL_CORE_FT
To get better and follow your care plan as instructed. To get better and follow your care plan as instructed.1) Please take Tylenol 650 mg every 4 to 6 hours by mouth for moderate pain control. Please do not exceed over 4,000 mg of Tylenol a day.  2) Please start taking Eliquis 2.5 mg by mouth twice a day starting 3 days after surgery. Please start Thursday May 19, 2022 .  3) Please take Omeprazole 40 mg once a day by mouth.

## 2022-05-17 NOTE — DIETITIAN INITIAL EVALUATION ADULT - PERTINENT LABORATORY DATA
05-17    139  |  106  |  6<L>  ----------------------------<  118<H>  3.7   |  20<L>  |  0.58    Ca    9.2      17 May 2022 06:17  Phos  2.8     05-17  Mg     1.8     05-17

## 2022-05-17 NOTE — DISCHARGE NOTE PROVIDER - CARE PROVIDER_API CALL
Loki Hughes (MD)  Surgery  [address] [place] [address]  Phone: [phone]  Fax: [phone]  Scheduled Appointment: 06/01/2022

## 2022-05-17 NOTE — DIETITIAN INITIAL EVALUATION ADULT - PERTINENT MEDS FT
MEDICATIONS  (STANDING):  heparin   Injectable 7500 Unit(s) SubCutaneous every 8 hours  ketorolac   Injectable 15 milliGRAM(s) IV Push every 6 hours  lactated ringers. 1000 milliLiter(s) (75 mL/Hr) IV Continuous <Continuous>  pantoprazole  Injectable 40 milliGRAM(s) IV Push daily    MEDICATIONS  (PRN):  ondansetron Injectable 4 milliGRAM(s) IV Push every 6 hours PRN Nausea and/or Vomiting

## 2022-05-17 NOTE — DISCHARGE NOTE PROVIDER - CARE PROVIDERS DIRECT ADDRESSES
[email or web address]

## 2022-05-17 NOTE — DISCHARGE NOTE PROVIDER - NSDCCPCAREPLAN_GEN_ALL_CORE_FT
PRINCIPAL DISCHARGE DIAGNOSIS  Diagnosis: Morbid obesity  Assessment and Plan of Treatment: 21 year old female with history of morbid obesity (BMI 39) s/p robotic  laparoscopic sleeve gastrectomy. Patient tolerated the procedure well. At time of discharge, pt was tolerating a bariatric clear liquid diet, and pt's pain was controlled. Plan is to follow up with Dr. Hughes in the office.  1) Please take Tylenol 650 mg every 4 to 6 hours by mouth for moderate pain control. Please do not exceed over 4,000 mg of Tylenol a day.  2) Please start taking Eliquis 2.5 mg by mouth twice a day starting 3 days after surgery. Please start Thursday May 19, 2022 .  3) Please take Omeprazole 40 mg once a day by mouth.

## 2022-05-17 NOTE — PROGRESS NOTE ADULT - SUBJECTIVE AND OBJECTIVE BOX
INTERVAL HPI/OVERNIGHT EVENTS: postop hgb13.1(13.4), passed TOV, thaddeus diet, ooba  5/16: s/p LSG, poc wnl      STATUS POST:  Robotic assisted sleeve gastrectomy      POST OPERATIVE DAY #: 1    SUBJECTIVE: Patient examined bedside with chief resident. Patient reports her pain is well controlled. Patient tolerating bariatric clear liquid diet 4 oz/hr. Patient reports she is ambulating and using incentive spirometer. Patient denies nausea , emesis , SOB and chest pain. Patient making adequate urine output.        Vital Signs Last 24 Hrs  T(C): 37.1 (17 May 2022 05:12), Max: 37.5 (16 May 2022 20:20)  T(F): 98.7 (17 May 2022 05:12), Max: 99.5 (16 May 2022 20:20)  HR: 70 (17 May 2022 05:12) (64 - 91)  BP: 115/77 (17 May 2022 05:12) (102/56 - 134/84)  BP(mean): 81 (16 May 2022 13:43) (72 - 82)  RR: 16 (17 May 2022 05:12) (16 - 25)  SpO2: 99% (17 May 2022 05:12) (96% - 99%)  I&O's Detail    16 May 2022 07:01  -  17 May 2022 07:00  --------------------------------------------------------  IN:    Lactated Ringers: 2700 mL  Total IN: 2700 mL    OUT:    Voided (mL): 1075 mL  Total OUT: 1075 mL    Total NET: 1625 mL          General: NAD, resting comfortably in bed  C/V: NSR  Pulm: Nonlabored breathing, no respiratory distress  Abd: soft, non distended , TTP around incision site, incision clean dry and intact   Extrem: WWP, no edema, SCDs in place        LABS:                        12.2   11.71 )-----------( 309      ( 17 May 2022 06:17 )             35.8     05-17    139  |  106  |  6<L>  ----------------------------<  118<H>  3.7   |  20<L>  |  0.58    Ca    9.2      17 May 2022 06:17  Phos  2.8     05-17  Mg     1.8     05-17

## 2022-05-17 NOTE — PROGRESS NOTE ADULT - ASSESSMENT
Patient is a 20 yo F with a history of morbid obesity and a BMI of 39 presenting for elective RA laparoscopic sleeve gastrectomy. Patent has no PMH and no PSH. No GERD  BCLD  IVF  Pain/nausea controlled   Protonix   f/u am labs

## 2022-05-17 NOTE — DIETITIAN INITIAL EVALUATION ADULT - OTHER CALCULATIONS
lbs. IBW used to calculate energy needs due to pt's current body weight exceeding 120% of IBW. Above energy needs calculated for wt maintenance (20-25kcal/kg).   Weeks 1-2 estimated needs: kcal/day (10-12kcal/kg), g pro/day (1.5-2.1g/kg), >/=64oz clear fluids.

## 2022-05-17 NOTE — DISCHARGE NOTE NURSING/CASE MANAGEMENT/SOCIAL WORK - PATIENT PORTAL LINK FT
You can access the FollowMyHealth Patient Portal offered by [facility] by registering at the following website: [email or web address]. By joining [employer]’s FollowMyHealth portal, you will also be able to view your health information using other applications (apps) compatible with our system.

## 2022-05-23 DIAGNOSIS — E66.01 MORBID (SEVERE) OBESITY DUE TO EXCESS CALORIES: ICD-10-CM

## 2022-06-01 ENCOUNTER — APPOINTMENT (OUTPATIENT)
Dept: BARIATRICS | Facility: CLINIC | Age: 22
End: 2022-06-01

## 2022-06-01 VITALS
OXYGEN SATURATION: 98 % | WEIGHT: 205.38 LBS | TEMPERATURE: 97.3 F | HEART RATE: 87 BPM | BODY MASS INDEX: 35.06 KG/M2 | DIASTOLIC BLOOD PRESSURE: 76 MMHG | SYSTOLIC BLOOD PRESSURE: 108 MMHG | HEIGHT: 64 IN

## 2022-06-01 PROCEDURE — 99024 POSTOP FOLLOW-UP VISIT: CPT

## 2022-06-03 LAB — SURGICAL PATHOLOGY STUDY: SIGNIFICANT CHANGE UP

## 2022-06-30 NOTE — HISTORY OF PRESENT ILLNESS
[de-identified] : Patient is doing well and has no complaints. She is meeting her protein needs and taking her vitamins. She is walking for exercise.

## 2022-07-06 ENCOUNTER — APPOINTMENT (OUTPATIENT)
Dept: BARIATRICS | Facility: CLINIC | Age: 22
End: 2022-07-06

## 2022-07-06 ENCOUNTER — NON-APPOINTMENT (OUTPATIENT)
Age: 22
End: 2022-07-06

## 2022-07-06 VITALS
HEIGHT: 64 IN | HEART RATE: 74 BPM | DIASTOLIC BLOOD PRESSURE: 72 MMHG | BODY MASS INDEX: 32.61 KG/M2 | OXYGEN SATURATION: 97 % | WEIGHT: 191 LBS | TEMPERATURE: 97.4 F | SYSTOLIC BLOOD PRESSURE: 108 MMHG

## 2022-07-06 DIAGNOSIS — E66.01 MORBID (SEVERE) OBESITY DUE TO EXCESS CALORIES: ICD-10-CM

## 2022-07-06 PROCEDURE — 99024 POSTOP FOLLOW-UP VISIT: CPT

## 2022-07-06 NOTE — ASSESSMENT
[FreeTextEntry1] : 22 year old female having a normal postoperative course 7 weeks s/p sleeve gastrectomy. Tolerating a regular diet well, compliant with vitamins and exercising regularly. Emphasized importance of meeting protein requirements and using shakes as supplementation.

## 2022-07-06 NOTE — HISTORY OF PRESENT ILLNESS
[de-identified] : Ms. Smallwood presents today for a follow up visit 7 weeks s/p sleeve gastrectomy on 5/16/22. Patient reports she is doing very well, down approximately 33lbs so far. Denies pain, n/v, heartburn or PO intolerance. Tolerating a regular diet well, working on incorporating protein in every meal and staying well hydrated. Completed course of medications, compliant with all vitamins. Denies any difficulties with bowel movements. Exercising regularly incorporating both cardio and strength training.

## 2022-09-07 ENCOUNTER — APPOINTMENT (OUTPATIENT)
Dept: BARIATRICS | Facility: CLINIC | Age: 22
End: 2022-09-07

## 2022-10-28 ENCOUNTER — APPOINTMENT (OUTPATIENT)
Dept: DERMATOLOGY | Facility: CLINIC | Age: 22
End: 2022-10-28

## 2022-10-28 DIAGNOSIS — L21.8 OTHER SEBORRHEIC DERMATITIS: ICD-10-CM

## 2022-10-28 PROCEDURE — 99204 OFFICE O/P NEW MOD 45 MIN: CPT

## 2022-10-28 NOTE — ASSESSMENT
[FreeTextEntry1] : seb derm\par with flares\par ciclopirox shampoo\par betamethasone foam (if not covered- fluocinol

## 2022-10-28 NOTE — HISTORY OF PRESENT ILLNESS
[FreeTextEntry1] : scalp issues [de-identified] : scalp issues\par since the summer\par gets itchy scabs\par goes away comes back in weeks\par never had it before never had dandruff

## 2022-10-28 NOTE — HISTORY OF PRESENT ILLNESS
[FreeTextEntry1] : scalp issues [de-identified] : scalp issues\par since the summer\par gets itchy scabs\par goes away comes back in weeks\par never had it before never had dandruff

## 2022-11-10 ENCOUNTER — RESULT REVIEW (OUTPATIENT)
Age: 22
End: 2022-11-10

## 2022-12-03 ENCOUNTER — TRANSCRIPTION ENCOUNTER (OUTPATIENT)
Age: 22
End: 2022-12-03

## 2022-12-30 NOTE — HEALTH RISK ASSESSMENT
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

## 2022-12-30 NOTE — HISTORY OF PRESENT ILLNESS
[de-identified] : 22 yo female here for CPE and establishment of care. Feeling well today, no complaints.

## 2022-12-30 NOTE — ASSESSMENT
[FreeTextEntry1] : CPE_ Well exam, comprehensive labs ordered. STD panel sent. Reports PAP up to date in the fall. Follow up labs. Patient will try to obtain records from old primary.

## 2023-01-06 ENCOUNTER — APPOINTMENT (OUTPATIENT)
Dept: DERMATOLOGY | Facility: CLINIC | Age: 23
End: 2023-01-06

## 2023-03-23 ENCOUNTER — NON-APPOINTMENT (OUTPATIENT)
Age: 23
End: 2023-03-23

## 2023-03-23 ENCOUNTER — APPOINTMENT (OUTPATIENT)
Dept: FAMILY MEDICINE | Facility: CLINIC | Age: 23
End: 2023-03-23
Payer: COMMERCIAL

## 2023-03-23 VITALS
HEART RATE: 73 BPM | TEMPERATURE: 97.6 F | WEIGHT: 116 LBS | DIASTOLIC BLOOD PRESSURE: 66 MMHG | HEIGHT: 64 IN | RESPIRATION RATE: 16 BRPM | SYSTOLIC BLOOD PRESSURE: 101 MMHG | OXYGEN SATURATION: 99 % | BODY MASS INDEX: 19.81 KG/M2

## 2023-03-23 PROCEDURE — 99395 PREV VISIT EST AGE 18-39: CPT | Mod: 25

## 2023-03-23 PROCEDURE — 36415 COLL VENOUS BLD VENIPUNCTURE: CPT

## 2023-03-23 NOTE — ASSESSMENT
[FreeTextEntry1] : CPE_ Well exam, comprehensive labs ordered. STD panel sent. Reports PAP up to date in the fall. Follow up labs drawn today.

## 2023-03-23 NOTE — HEALTH RISK ASSESSMENT
[Good] : ~his/her~  mood as  good [0] : 2) Feeling down, depressed, or hopeless: Not at all (0) [PHQ-2 Negative - No further assessment needed] : PHQ-2 Negative - No further assessment needed [Patient reported PAP Smear was normal] : Patient reported PAP Smear was normal [HIV Test offered] : HIV Test offered [Hepatitis C test offered] : Hepatitis C test offered [With Significant Other] : lives with significant other [With Family] : lives with family [Employed] : employed [] :  [[id number]] : 0 [PapSmearDate] : 09/22 [de-identified] : Mom [de-identified] :  Psychiatry

## 2023-03-23 NOTE — HISTORY OF PRESENT ILLNESS
[de-identified] : 23 yo female here for CPE. Feeling well today, no complaints. \par \par Had bariatric surgery 1 year ago and has been doing great. Has lost about 100 lbs since.

## 2023-03-24 ENCOUNTER — NON-APPOINTMENT (OUTPATIENT)
Age: 23
End: 2023-03-24

## 2023-03-24 DIAGNOSIS — E78.5 HYPERLIPIDEMIA, UNSPECIFIED: ICD-10-CM

## 2023-03-24 LAB
25(OH)D3 SERPL-MCNC: 54.8 NG/ML
ALBUMIN SERPL ELPH-MCNC: 4.6 G/DL
ALP BLD-CCNC: 48 U/L
ALT SERPL-CCNC: 10 U/L
ANION GAP SERPL CALC-SCNC: 13 MMOL/L
APPEARANCE: CLEAR
AST SERPL-CCNC: 22 U/L
BACTERIA: NEGATIVE
BASOPHILS # BLD AUTO: 0.04 K/UL
BASOPHILS NFR BLD AUTO: 0.8 %
BILIRUB SERPL-MCNC: 0.3 MG/DL
BILIRUBIN URINE: NEGATIVE
BLOOD URINE: ABNORMAL
BUN SERPL-MCNC: 11 MG/DL
C TRACH RRNA SPEC QL NAA+PROBE: NOT DETECTED
CALCIUM SERPL-MCNC: 9.8 MG/DL
CHLORIDE SERPL-SCNC: 104 MMOL/L
CHOLEST SERPL-MCNC: 244 MG/DL
CO2 SERPL-SCNC: 23 MMOL/L
COLOR: YELLOW
CREAT SERPL-MCNC: 0.84 MG/DL
EGFR: 99 ML/MIN/1.73M2
EOSINOPHIL # BLD AUTO: 0.06 K/UL
EOSINOPHIL NFR BLD AUTO: 1.3 %
ESTIMATED AVERAGE GLUCOSE: 100 MG/DL
FERRITIN SERPL-MCNC: 114 NG/ML
FOLATE SERPL-MCNC: 13.5 NG/ML
GLUCOSE QUALITATIVE U: NEGATIVE
GLUCOSE SERPL-MCNC: 71 MG/DL
HAV IGM SER QL: NONREACTIVE
HBA1C MFR BLD HPLC: 5.1 %
HBV CORE IGM SER QL: NONREACTIVE
HBV SURFACE AG SER QL: NONREACTIVE
HCT VFR BLD CALC: 42.7 %
HCV AB SER QL: NONREACTIVE
HCV S/CO RATIO: 0.12 S/CO
HDLC SERPL-MCNC: 62 MG/DL
HGB BLD-MCNC: 13.6 G/DL
HIV1+2 AB SPEC QL IA.RAPID: NONREACTIVE
HYALINE CASTS: 0 /LPF
IMM GRANULOCYTES NFR BLD AUTO: 0.2 %
IRON SATN MFR SERPL: 33 %
IRON SERPL-MCNC: 120 UG/DL
KETONES URINE: NEGATIVE
LDLC SERPL CALC-MCNC: 167 MG/DL
LEUKOCYTE ESTERASE URINE: NEGATIVE
LYMPHOCYTES # BLD AUTO: 1.59 K/UL
LYMPHOCYTES NFR BLD AUTO: 33.6 %
MAN DIFF?: NORMAL
MCHC RBC-ENTMCNC: 30.1 PG
MCHC RBC-ENTMCNC: 31.9 GM/DL
MCV RBC AUTO: 94.5 FL
MICROSCOPIC-UA: NORMAL
MONOCYTES # BLD AUTO: 0.27 K/UL
MONOCYTES NFR BLD AUTO: 5.7 %
N GONORRHOEA RRNA SPEC QL NAA+PROBE: NOT DETECTED
NEUTROPHILS # BLD AUTO: 2.76 K/UL
NEUTROPHILS NFR BLD AUTO: 58.4 %
NITRITE URINE: NEGATIVE
NONHDLC SERPL-MCNC: 182 MG/DL
PH URINE: 6.5
PLATELET # BLD AUTO: 302 K/UL
POTASSIUM SERPL-SCNC: 4.7 MMOL/L
PROT SERPL-MCNC: 7.4 G/DL
PROTEIN URINE: ABNORMAL
RBC # BLD: 4.52 M/UL
RBC # FLD: 12.7 %
RED BLOOD CELLS URINE: 8 /HPF
SODIUM SERPL-SCNC: 140 MMOL/L
SOURCE AMPLIFICATION: NORMAL
SPECIFIC GRAVITY URINE: 1.03
SQUAMOUS EPITHELIAL CELLS: 3 /HPF
T PALLIDUM AB SER QL IA: NEGATIVE
T4 FREE SERPL-MCNC: 1.3 NG/DL
TIBC SERPL-MCNC: 364 UG/DL
TRANSFERRIN SERPL-MCNC: 287 MG/DL
TRIGL SERPL-MCNC: 72 MG/DL
TSH SERPL-ACNC: 0.83 UIU/ML
UIBC SERPL-MCNC: 245 UG/DL
UROBILINOGEN URINE: ABNORMAL
VIT B12 SERPL-MCNC: 377 PG/ML
WBC # FLD AUTO: 4.73 K/UL
WHITE BLOOD CELLS URINE: 0 /HPF

## 2023-05-24 ENCOUNTER — TRANSCRIPTION ENCOUNTER (OUTPATIENT)
Age: 23
End: 2023-05-24

## 2023-05-25 ENCOUNTER — APPOINTMENT (OUTPATIENT)
Dept: NEUROLOGY | Facility: CLINIC | Age: 23
End: 2023-05-25

## 2023-05-25 ENCOUNTER — APPOINTMENT (OUTPATIENT)
Dept: NEUROLOGY | Facility: CLINIC | Age: 23
End: 2023-05-25
Payer: COMMERCIAL

## 2023-05-25 VITALS
DIASTOLIC BLOOD PRESSURE: 71 MMHG | OXYGEN SATURATION: 97 % | BODY MASS INDEX: 18.78 KG/M2 | TEMPERATURE: 97.6 F | SYSTOLIC BLOOD PRESSURE: 104 MMHG | HEART RATE: 65 BPM | WEIGHT: 110 LBS | HEIGHT: 64 IN

## 2023-05-25 DIAGNOSIS — R25.1 TREMOR, UNSPECIFIED: ICD-10-CM

## 2023-05-25 PROCEDURE — 99204 OFFICE O/P NEW MOD 45 MIN: CPT

## 2023-05-26 ENCOUNTER — TRANSCRIPTION ENCOUNTER (OUTPATIENT)
Age: 23
End: 2023-05-26

## 2023-05-26 DIAGNOSIS — R45.86 EMOTIONAL LABILITY: ICD-10-CM

## 2023-05-30 ENCOUNTER — APPOINTMENT (OUTPATIENT)
Dept: BARIATRICS | Facility: CLINIC | Age: 23
End: 2023-05-30
Payer: COMMERCIAL

## 2023-05-30 ENCOUNTER — NON-APPOINTMENT (OUTPATIENT)
Age: 23
End: 2023-05-30

## 2023-05-30 VITALS — BODY MASS INDEX: 18.54 KG/M2 | WEIGHT: 108 LBS

## 2023-05-30 PROCEDURE — 97803 MED NUTRITION INDIV SUBSEQ: CPT | Mod: 95

## 2023-06-01 ENCOUNTER — TRANSCRIPTION ENCOUNTER (OUTPATIENT)
Age: 23
End: 2023-06-01

## 2023-06-06 ENCOUNTER — APPOINTMENT (OUTPATIENT)
Dept: FAMILY MEDICINE | Facility: CLINIC | Age: 23
End: 2023-06-06

## 2023-06-06 ENCOUNTER — NON-APPOINTMENT (OUTPATIENT)
Age: 23
End: 2023-06-06

## 2023-06-06 LAB
25(OH)D3 SERPL-MCNC: 52.6 NG/ML
CHOLEST SERPL-MCNC: 180 MG/DL
HDLC SERPL-MCNC: 66 MG/DL
LDLC SERPL CALC-MCNC: 101 MG/DL
NONHDLC SERPL-MCNC: 114 MG/DL
TRIGL SERPL-MCNC: 65 MG/DL

## 2023-06-09 ENCOUNTER — APPOINTMENT (OUTPATIENT)
Dept: NEUROLOGY | Facility: CLINIC | Age: 23
End: 2023-06-09
Payer: COMMERCIAL

## 2023-06-09 ENCOUNTER — OUTPATIENT (OUTPATIENT)
Dept: OUTPATIENT SERVICES | Facility: HOSPITAL | Age: 23
LOS: 1 days | End: 2023-06-09
Payer: COMMERCIAL

## 2023-06-09 ENCOUNTER — APPOINTMENT (OUTPATIENT)
Dept: MRI IMAGING | Facility: HOSPITAL | Age: 23
End: 2023-06-09
Payer: COMMERCIAL

## 2023-06-09 PROCEDURE — 70551 MRI BRAIN STEM W/O DYE: CPT | Mod: 26

## 2023-06-09 PROCEDURE — 70551 MRI BRAIN STEM W/O DYE: CPT

## 2023-06-10 PROCEDURE — 95700 EEG CONT REC W/VID EEG TECH: CPT

## 2023-06-10 PROCEDURE — 95708 EEG WO VID EA 12-26HR UNMNTR: CPT

## 2023-06-10 PROCEDURE — 95719 EEG PHYS/QHP EA INCR W/O VID: CPT

## 2023-06-12 ENCOUNTER — TRANSCRIPTION ENCOUNTER (OUTPATIENT)
Age: 23
End: 2023-06-12

## 2023-06-13 ENCOUNTER — APPOINTMENT (OUTPATIENT)
Dept: NEUROLOGY | Facility: CLINIC | Age: 23
End: 2023-06-13

## 2023-06-15 ENCOUNTER — TRANSCRIPTION ENCOUNTER (OUTPATIENT)
Age: 23
End: 2023-06-15

## 2023-06-21 ENCOUNTER — APPOINTMENT (OUTPATIENT)
Dept: NEUROSURGERY | Facility: CLINIC | Age: 23
End: 2023-06-21
Payer: COMMERCIAL

## 2023-06-21 ENCOUNTER — TRANSCRIPTION ENCOUNTER (OUTPATIENT)
Age: 23
End: 2023-06-21

## 2023-06-21 ENCOUNTER — NON-APPOINTMENT (OUTPATIENT)
Age: 23
End: 2023-06-21

## 2023-06-21 VITALS
OXYGEN SATURATION: 98 % | HEIGHT: 64 IN | HEART RATE: 76 BPM | RESPIRATION RATE: 18 BRPM | SYSTOLIC BLOOD PRESSURE: 104 MMHG | WEIGHT: 108 LBS | DIASTOLIC BLOOD PRESSURE: 70 MMHG | BODY MASS INDEX: 18.44 KG/M2

## 2023-06-21 PROCEDURE — 99204 OFFICE O/P NEW MOD 45 MIN: CPT

## 2023-06-21 NOTE — DATA REVIEWED
[de-identified] : \par \par \par ACC: [id number] EXAM: MR BRAIN ORDERED BY: RAPHAEL RUBIOMING\par \par PROCEDURE DATE: 06/09/2023\par \par \par \par INTERPRETATION: PROCEDURE: MRI Brain without contrast\par \par INDICATION: Episode of seizure like activity\par \par TECHNIQUE: Sagittal T1, axial T1, T2, FLAIR, diffusion, gradient echo images of the brain were obtained. Coronal FLAIR and T2 images through the temporal lobes were also obtained. Following the intravenous administration of gadolinium, axial and coronal T1-weighted images of the brain were obtained. Some of the images are degraded by motion artifact.\par \par COMPARISON: None\par \par FINDINGS: The MRI examination of the brain demonstrates the ventricles, cisternal spaces, and cortical sulci to be appropriate for the patient's stated age. There is no midline shift or extra-axial collection. There is a approximately 6 mm pineal cyst. No abnormal signal is identified within the brain parenchyma. The diffusion-weighted images demonstrate no acute ischemia. The GRE images demonstrate no hemorrhagic products. There are normal vascular flow-voids.\par \par Examination of the temporal lobes demonstrates the hippocampus to be within normal limits. No abnormal signal is identified within the hippocampus.\par \par There is a small retention cyst within the left maxillary sinus. The rest of the visualized paranasal sinuses are free of mucosal disease. There is hyperintense T2 signal within left petrous apex (series 6 image 6) which demonstrates isointense to hyperintense signal on the T2-weighted images. This may represent cholesterol granuloma or effusion. The mastoid air cells are otherwise well-aerated.\par \par IMPRESSION: No hippocampal atrophy or signal abnormality. 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

## 2023-06-21 NOTE — HISTORY OF PRESENT ILLNESS
[de-identified] : 24 y/o female with PMHx of HLD, asthma, LON, thyroid d/o, Vitamin D deficiency, Vit B12 deficiency who presents for evaluation of pineal cyst and left petrous apex cholesterol granuloma recently dx on MRI 6/9/23 during workup with neurologist Dr. Dc after episode of fainting with some shaking after after going to the bathroom. \par \par Pt saw Dr. Dc for evaluation and endorses was likely syncopal episode, non seizure. \par She denies any more episodes since. \par \par Denies headaches, dizziness, balance issues, hearing loss, other new/worsening focal neuro deficits. \par \par Neurologist: Dr. Sue Dc

## 2023-06-21 NOTE — REVIEW OF SYSTEMS
[As Noted in HPI] : as noted in HPI [Fever] : no fever [Chills] : no chills [Facial Weakness] : no facial weakness [Arm Weakness] : no arm weakness [Hand Weakness] : no hand weakness [Leg Weakness] : no leg weakness [Seizures] : no convulsions [Dizziness] : no dizziness [Eyesight Problems] : no eyesight problems [Loss Of Hearing] : no hearing loss [Chest Pain] : no chest pain [Palpitations] : no palpitations [Shortness Of Breath] : no shortness of breath

## 2023-06-21 NOTE — PHYSICAL EXAM
[Oriented To Time, Place, And Person] : oriented to person, place, and time [Impaired Insight] : insight and judgment were intact [Motor Tone] : muscle tone was normal in all four extremities [Motor Strength] : muscle strength was normal in all four extremities [Motor Handedness Right-Handed] : the patient is right hand dominant [Sensation Tactile Decrease] : light touch was intact [Balance] : balance was intact [Sclera] : the sclera and conjunctiva were normal [PERRL With Normal Accommodation] : pupils were equal in size, round, reactive to light, with normal accommodation [Extraocular Movements] : extraocular movements were intact [Full Visual Field] : full visual field [Hearing Threshold Finger Rub Not [place]] : hearing was normal [Neck Appearance] : the appearance of the neck was normal [] : no respiratory distress [Respiration, Rhythm And Depth] : normal respiratory rhythm and effort [Abnormal Walk] : normal gait [Skin Color & Pigmentation] : normal skin color and pigmentation [FreeTextEntry5] : CN II- XII grossly intact

## 2023-06-21 NOTE — ASSESSMENT
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 No symptoms from cranial findings. Will repeat MRI with IAC protocol in 6 months and refer to ENT for evaluation. No acute neurosurgical intervention indicated. \par \par Dr. D' Amico independently reviewed all available images with patient. \par \par PLAN: \par - Otolaryngology referral to Dr. Mateo Cooley \par - Repeat MRI with IAC 6 months (Dec 2023)\par - RTC after MRI to review \par \par Patient verbalizes understanding of today’s discussion and next steps in treatment plan. \par \par  \par A total of 45 minutes was spent reviewing the labs, imaging and physical examination of the patient. We discussed the diagnosis, and the plan. The patient's questions were answered. The patient demonstrated an excellent understanding of the plan.

## 2023-06-22 ENCOUNTER — TRANSCRIPTION ENCOUNTER (OUTPATIENT)
Age: 23
End: 2023-06-22

## 2023-06-28 ENCOUNTER — APPOINTMENT (OUTPATIENT)
Dept: BARIATRICS | Facility: CLINIC | Age: 23
End: 2023-06-28
Payer: COMMERCIAL

## 2023-06-28 VITALS
SYSTOLIC BLOOD PRESSURE: 100 MMHG | OXYGEN SATURATION: 98 % | DIASTOLIC BLOOD PRESSURE: 66 MMHG | HEART RATE: 59 BPM | BODY MASS INDEX: 18.27 KG/M2 | TEMPERATURE: 98.1 F | HEIGHT: 64 IN | WEIGHT: 107 LBS

## 2023-06-28 PROCEDURE — 99214 OFFICE O/P EST MOD 30 MIN: CPT

## 2023-06-30 ENCOUNTER — APPOINTMENT (OUTPATIENT)
Dept: PSYCHIATRY | Facility: CLINIC | Age: 23
End: 2023-06-30

## 2023-06-30 LAB
25(OH)D3 SERPL-MCNC: 52.8 NG/ML
ALBUMIN SERPL ELPH-MCNC: 4.7 G/DL
ALP BLD-CCNC: 47 U/L
ALT SERPL-CCNC: 14 U/L
ANION GAP SERPL CALC-SCNC: 14 MMOL/L
APTT BLD: 29.2 SEC
AST SERPL-CCNC: 19 U/L
BILIRUB SERPL-MCNC: 0.3 MG/DL
BUN SERPL-MCNC: 13 MG/DL
CA-I SERPL-SCNC: 5 MG/DL
CALCIUM SERPL-MCNC: 9.7 MG/DL
CALCIUM SERPL-MCNC: 9.7 MG/DL
CHLORIDE SERPL-SCNC: 102 MMOL/L
CHOLEST SERPL-MCNC: 177 MG/DL
CO2 SERPL-SCNC: 22 MMOL/L
CREAT SERPL-MCNC: 0.81 MG/DL
EGFR: 103 ML/MIN/1.73M2
ESTIMATED AVERAGE GLUCOSE: 100 MG/DL
FOLATE SERPL-MCNC: 14.6 NG/ML
GLUCOSE SERPL-MCNC: 76 MG/DL
HBA1C MFR BLD HPLC: 5.1 %
HDLC SERPL-MCNC: 72 MG/DL
INR PPP: 1.03 RATIO
IRON SATN MFR SERPL: 42 %
IRON SERPL-MCNC: 153 UG/DL
LDLC SERPL CALC-MCNC: 91 MG/DL
NONHDLC SERPL-MCNC: 104 MG/DL
PARATHYROID HORMONE INTACT: 38 PG/ML
POTASSIUM SERPL-SCNC: 4.5 MMOL/L
PREALB SERPL NEPH-MCNC: 29 MG/DL
PROT SERPL-MCNC: 7.7 G/DL
PT BLD: 12.2 SEC
SODIUM SERPL-SCNC: 138 MMOL/L
TIBC SERPL-MCNC: 366 UG/DL
TRIGL SERPL-MCNC: 68 MG/DL
TSH SERPL-ACNC: 1.36 UIU/ML
UIBC SERPL-MCNC: 214 UG/DL
VIT B12 SERPL-MCNC: 679 PG/ML

## 2023-06-30 NOTE — ASSESSMENT
[FreeTextEntry1] : Pt is a 22 y/o F 1 yr s/p VSG with  who presents today feeling well here to establish bariatric care. 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 Pt reports doing well overall since her sx and denies any n,v,c,d, ab pn. Pt reports infrequent GERD since sx, will rx protonix x 6 weeks. Reports tolerating her diet overall and states she eats q2 hrs as she gets hungry often. Pt previously followed up with neurosurgery for a fainting episode which she states happened one time. MRI revealed incidental pineal cyst and will return for MRI in 6 mo, was referred to ENT for this. Pt presents today appearing healthy but very slender, weighing 107 lbs, BMI 18. Likely her syncopal episode was bariatric related due to possible vitamin or protein deficiency, hypoglycemia, or dehydration. Pt states she is vit compliant and is consuming adequate daily fluids. Pt states she has lost about 115lbs since her sx and wants to work on gaining some weight which I also recommend. Discussed limiting cardio exercise at this time and incorporating strength training 2-3x/week for 30 mins max per session. Discussed increasing protein intake, recommended 2 protein shakes daily and eating q2-3 hrs focusing on high protein foods and incorporating healthy fats. Goal weight around 120lbs. Will consult with nutrition today and obtain labs, will f/u in 1 week to review.

## 2023-06-30 NOTE — PLAN
[FreeTextEntry1] : labs\par protonix x 6 weeks\par nutrition\par increase protein and healthy fat intake\par f/u 1 mo, close monitoring until wt stabilizes  400 ft

## 2023-07-25 ENCOUNTER — APPOINTMENT (OUTPATIENT)
Dept: OTOLARYNGOLOGY | Facility: CLINIC | Age: 23
End: 2023-07-25
Payer: COMMERCIAL

## 2023-07-25 VITALS
SYSTOLIC BLOOD PRESSURE: 111 MMHG | DIASTOLIC BLOOD PRESSURE: 73 MMHG | HEIGHT: 64 IN | HEART RATE: 77 BPM | BODY MASS INDEX: 18.44 KG/M2 | TEMPERATURE: 97.3 F | WEIGHT: 108 LBS

## 2023-07-25 PROCEDURE — 99203 OFFICE O/P NEW LOW 30 MIN: CPT

## 2023-07-25 NOTE — HISTORY OF PRESENT ILLNESS
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 D'Amico for incidental granuloma found on recent brain MRI. She had an episode of shaking after experiencing a panic attack recently and had a workup to rule out any seizure. Otherwise she is completely asymptomatic in terms of her hearing and balance. No episodes of imbalance, no changes in hearing, ear fullness, ringing of the ears, pain/headaches, or ear drainage. Dr D'Amico advised to have follow up imaging at 6 month intervals and wanted to consult with Dr. Cooley to follow this lesion. Otherwise patient is healthy and not complaining of any other issues at this time.

## 2023-08-18 ENCOUNTER — APPOINTMENT (OUTPATIENT)
Dept: PSYCHIATRY | Facility: CLINIC | Age: 23
End: 2023-08-18

## 2023-08-23 ENCOUNTER — APPOINTMENT (OUTPATIENT)
Dept: BARIATRICS | Facility: CLINIC | Age: 23
End: 2023-08-23
Payer: COMMERCIAL

## 2023-08-23 VITALS
BODY MASS INDEX: 18.61 KG/M2 | HEART RATE: 68 BPM | HEIGHT: 64 IN | DIASTOLIC BLOOD PRESSURE: 78 MMHG | SYSTOLIC BLOOD PRESSURE: 116 MMHG | TEMPERATURE: 97.4 F | WEIGHT: 109 LBS | OXYGEN SATURATION: 98 %

## 2023-08-23 DIAGNOSIS — Z98.84 BARIATRIC SURGERY STATUS: ICD-10-CM

## 2023-08-23 PROCEDURE — 99213 OFFICE O/P EST LOW 20 MIN: CPT

## 2023-08-23 NOTE — HISTORY OF PRESENT ILLNESS
[de-identified] : Pt is a 22 y/o F 1 yr s/p VSG with  who presents today feeling well here for f/u after obtaining labs and wt assessment. Pt presented to us last visit with BMI of 18 and reports losing about 115 lbs since her sx. Pt obtained labwork which was all wnl and states she feels great overall denying any weakness, dizziness, and denies any hypoglycemic episodes after increasing her eating to q2-3 hrs. Denies any n,v,c,d, abd pn but does note reflux overnight. Pt is not taking any GERD medication at this time, will start on PPI BID and famotidine once daily. Pt states she is compliant with her vit regimen. Pt has gained 1 lb from last visit and has been focusing on strength training and avoiding cardio. Pt is stable at this time.

## 2023-08-23 NOTE — PLAN
[FreeTextEntry1] : PPI BID and famotidine x 6 weeks f/u after to assess gerd symptoms hold cardio, continue strength training high protein and healthy fat intake

## 2023-08-23 NOTE — ASSESSMENT
[FreeTextEntry1] : Pt is a 24 y/o F 1 yr s/p VSG with  who presents today feeling well here for f/u after obtaining labs and wt assessment. Pt presented to us last visit with BMI of 18 and reports losing about 115 lbs since her sx. Pt obtained labwork which was all wnl and states she feels great overall denying any weakness, dizziness, and denies any hypoglycemic episodes after increasing her eating to q2-3 hrs. Denies any n,v,c,d, abd pn but does note reflux overnight. Pt is not taking any GERD medication at this time, will start on PPI BID and famotidine once daily. Pt states she is compliant with her vit regimen. Pt has gained 1 lb from last visit and has been focusing on strength training and avoiding cardio. Pt is stable at this time.

## 2023-09-05 LAB
A-TOCOPHEROL VIT E SERPL-MCNC: 7.2 MG/L
BETA+GAMMA TOCOPHEROL SERPL-MCNC: 0.5 MG/L
VIT A SERPL-MCNC: 54.6 UG/DL
VIT B1 SERPL-MCNC: 133.9 NMOL/L
ZINC SERPL-MCNC: 80 UG/DL

## 2023-09-26 NOTE — PATIENT PROFILE ADULT - FUNCTIONAL ASSESSMENT - BASIC MOBILITY 6.
Bed/Stretcher in lowest position, wheels locked, appropriate side rails in place/Call bell, personal items and telephone in reach/Instruct patient to call for assistance before getting out of bed/chair/stretcher/Non-slip footwear applied when patient is off stretcher/[place] to call system/Physically safe environment - no spills, clutter or unnecessary equipment/Purposeful proactive rounding/Room/bathroom lighting operational, light cord in reach 4 = No assist / stand by assistance

## 2023-10-04 ENCOUNTER — APPOINTMENT (OUTPATIENT)
Dept: BARIATRICS | Facility: CLINIC | Age: 23
End: 2023-10-04

## 2023-12-12 ENCOUNTER — APPOINTMENT (OUTPATIENT)
Dept: MRI IMAGING | Facility: HOSPITAL | Age: 23
End: 2023-12-12

## 2023-12-12 ENCOUNTER — OUTPATIENT (OUTPATIENT)
Dept: OUTPATIENT SERVICES | Facility: HOSPITAL | Age: 23
LOS: 1 days | End: 2023-12-12
Payer: COMMERCIAL

## 2023-12-12 PROCEDURE — 70553 MRI BRAIN STEM W/O & W/DYE: CPT | Mod: 26

## 2023-12-12 PROCEDURE — 70553 MRI BRAIN STEM W/O & W/DYE: CPT

## 2023-12-12 PROCEDURE — A9585: CPT

## 2023-12-14 PROBLEM — E34.8 PINEAL GLAND CYST: Status: ACTIVE | Noted: 2023-06-19

## 2023-12-14 PROBLEM — H71.90 CHOLESTEATOMA: Status: ACTIVE | Noted: 2023-06-21

## 2023-12-14 NOTE — REASON FOR VISIT
[Home] : at home, [unfilled] , at the time of the visit. [Medical Office: ([facility])___] : at the medical office located in  [Patient] : the patient [Follow-Up: _____] : a [unfilled] follow-up visit

## 2023-12-20 ENCOUNTER — APPOINTMENT (OUTPATIENT)
Dept: NEUROSURGERY | Facility: CLINIC | Age: 23
End: 2023-12-20
Payer: COMMERCIAL

## 2023-12-20 DIAGNOSIS — H71.90 UNSPECIFIED CHOLESTEATOMA, UNSPECIFIED EAR: ICD-10-CM

## 2023-12-20 DIAGNOSIS — E34.8 OTHER SPECIFIED ENDOCRINE DISORDERS: ICD-10-CM

## 2023-12-20 PROCEDURE — 99212 OFFICE O/P EST SF 10 MIN: CPT | Mod: 95

## 2023-12-20 NOTE — DATA REVIEWED
[de-identified] : ACC: [id number]     EXAM:  MR BRAIN WAW IC   ORDERED BY: EM MCKEON  PROCEDURE DATE:  12/12/2023    INTERPRETATION:  PROCEDURE: MRI Brain with and without contrast  INDICATION: Pineal gland cyst, cholesteatoma.  TECHNIQUE: Multiplanar multisequence imaging of the brain and bilateral internal auditory canals was obtained before and after intravenous administration of 7.5 cc Gadavist contrast material.  COMPARISON: MRI brain 6/9/2023  FINDINGS:  Redemonstrated 8mm pineal T2/T1 hypointense mass.  The ventricles, cisternal spaces, and cortical sulci are normal in size and configuration.  There is no mass, mass effect, midline shift or extra-axial collection.  No abnormal signal is identified within the brain parenchyma. The diffusion-weighted images demonstrate no recent infarction. The gradient echo images show no parenchymal blood product deposition. There are preserved large vascular flow-voids.  The postcontrast images demonstrate no abnormal intracranial enhancement.  The visualized paranasal sinuses are free of mucosal disease. Again demonstrated is hyperintense T2 and isointense T1 signal within the left petrous apex (6-6) which is nonenhancing and does not restrict diffusion. The mastoid air cells are well-aerated.  IMPRESSION:  Stable appearance of an 8 mm pineal cyst. Redemonstrated left petrous apex T2 hyperintense lesion, likely represents effusion versus cholesterol granuloma.  --- End of Report ---

## 2023-12-20 NOTE — HISTORY OF PRESENT ILLNESS
[FreeTextEntry1] : 24 y/o female with PMHx of HLD, asthma, LON, thyroid d/o, Vitamin D deficiency, Vit B12 deficiency who presented for evaluation of pineal cyst and left petrous apex cholesterol granuloma dx on MRI 6/9/23 during workup with neurologist Dr. Dc after episode of fainting with some shaking after after going to the bathroom.  Pt saw Dr. Dc for evaluation and endorses was likely syncopal episode, non seizure. No more episodes following intial. Referred to neurosurgery for eval.    6/21/23 initial visit: Asymptomatic from cranial findings. Recommended she repeat MRI with IAC protocol 6 months and referred to ENT for further eval of  left cholesterol granuloma.   Interim:  Eval with ENT Mateo Cooley. Asymptomatic. Plan made for her to f/u 6 months after imaging.   Returns today for 6 month f/u and MRI review.  Without complaints for today's visit. Denies headaches, dizziness, hearing loss or tinnitus.   Neurologist: Sue Dc ENT: Mateo Cooley

## 2023-12-20 NOTE — ASSESSMENT
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 No symptoms from cranial findings. MRI stable at 6 months. Will repeat in 1 year. No acute neurosurgical intervention indicated.  Dr. D'Amico independently reviewed all available images with patient.   PLAN: - Repeat MRI brain and IAC 1 year - RTC after MRI for review, sooner if new symptoms    Patient verbalizes understanding of today's discussion and next steps in treatment plan.     A total of 15 minutes was spent reviewing the labs, imaging and physical examination of the patient. We discussed the diagnosis, and the plan. The patient's questions were answered. The patient demonstrated an excellent understanding of the plan.

## 2024-06-10 ENCOUNTER — NON-APPOINTMENT (OUTPATIENT)
Age: 24
End: 2024-06-10

## 2024-06-11 ENCOUNTER — APPOINTMENT (OUTPATIENT)
Dept: FAMILY MEDICINE | Facility: CLINIC | Age: 24
End: 2024-06-11
Payer: COMMERCIAL

## 2024-06-11 VITALS
HEIGHT: 64 IN | OXYGEN SATURATION: 98 % | DIASTOLIC BLOOD PRESSURE: 81 MMHG | SYSTOLIC BLOOD PRESSURE: 112 MMHG | BODY MASS INDEX: 20.49 KG/M2 | WEIGHT: 120 LBS | TEMPERATURE: 97.3 F | HEART RATE: 71 BPM

## 2024-06-11 DIAGNOSIS — Z00.00 ENCOUNTER FOR GENERAL ADULT MEDICAL EXAMINATION W/OUT ABNORMAL FINDINGS: ICD-10-CM

## 2024-06-11 PROCEDURE — 99395 PREV VISIT EST AGE 18-39: CPT

## 2024-06-11 RX ORDER — LEVONORGESTREL AND ETHINYL ESTRADIOL 0.1-0.02MG
0.1-2 KIT ORAL
Refills: 0 | Status: DISCONTINUED | COMMUNITY
End: 2024-06-11

## 2024-06-11 RX ORDER — FLUOCINOLONE ACETONIDE 0.1 MG/ML
0.01 SOLUTION TOPICAL
Qty: 60 | Refills: 5 | Status: DISCONTINUED | COMMUNITY
Start: 2022-10-28 | End: 2024-06-11

## 2024-06-11 RX ORDER — PANTOPRAZOLE 40 MG/1
40 TABLET, DELAYED RELEASE ORAL TWICE DAILY
Qty: 84 | Refills: 0 | Status: DISCONTINUED | COMMUNITY
Start: 2023-08-23 | End: 2024-06-11

## 2024-06-11 RX ORDER — CICLOPIROX 10 MG/.96ML
1 SHAMPOO TOPICAL
Qty: 1 | Refills: 6 | Status: DISCONTINUED | COMMUNITY
Start: 2022-10-28 | End: 2024-06-11

## 2024-06-11 RX ORDER — FAMOTIDINE 40 MG/1
40 TABLET, FILM COATED ORAL
Qty: 42 | Refills: 0 | Status: DISCONTINUED | COMMUNITY
Start: 2023-08-23 | End: 2024-06-11

## 2024-06-11 RX ORDER — ESCITALOPRAM OXALATE 10 MG/1
10 TABLET, FILM COATED ORAL
Refills: 0 | Status: ACTIVE | COMMUNITY
Start: 2023-05-26

## 2024-06-11 RX ORDER — MEDROXYPROGESTERONE ACETATE 150 MG/ML
150 INJECTION, SUSPENSION INTRAMUSCULAR
Refills: 0 | Status: ACTIVE | COMMUNITY
Start: 2024-06-11

## 2024-06-11 RX ORDER — BETAMETHASONE VALERATE FOAM 1.2 MG/G
0.12 AEROSOL, FOAM TOPICAL
Qty: 1 | Refills: 3 | Status: DISCONTINUED | COMMUNITY
Start: 2022-10-28 | End: 2024-06-11

## 2024-06-11 NOTE — HISTORY OF PRESENT ILLNESS
[de-identified] : 22 yo female here for CPE. Feeling well today, no complaints.   Had bariatric surgery 2 year ago and has been doing great. Now at goal weight 120 lbs.   Reports she had syncopal episode 1 year ago. Saw Neuro, had normal EEG, MRI showing left cholesterol granuloma within petrous apex and incidental pineal cyst. Saw ENT and neurosx. Will have annual MRI for monitoring.  H/o anxiety which is controlled with lexapro 10 mg. Seeing p[sychiatrist every other month. Working with therapist weekly.

## 2024-06-11 NOTE — HEALTH RISK ASSESSMENT
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

## 2024-06-11 NOTE — ASSESSMENT
[FreeTextEntry1] : CPE_ Well exam, comprehensive labs ordered. STD panel sent. Reports PAP up to date in the fall. Follow up, return to lab fasting.

## 2024-06-12 ENCOUNTER — NON-APPOINTMENT (OUTPATIENT)
Age: 24
End: 2024-06-12

## 2024-06-13 LAB
25(OH)D3 SERPL-MCNC: 40 NG/ML
ALBUMIN SERPL ELPH-MCNC: 4.5 G/DL
ALP BLD-CCNC: 61 U/L
ALT SERPL-CCNC: 13 U/L
ANION GAP SERPL CALC-SCNC: 11 MMOL/L
AST SERPL-CCNC: 19 U/L
BASOPHILS # BLD AUTO: 0.05 K/UL
BASOPHILS NFR BLD AUTO: 0.9 %
BILIRUB SERPL-MCNC: 0.4 MG/DL
BUN SERPL-MCNC: 11 MG/DL
CALCIUM SERPL-MCNC: 9.7 MG/DL
CHLORIDE SERPL-SCNC: 105 MMOL/L
CHOLEST SERPL-MCNC: 177 MG/DL
CO2 SERPL-SCNC: 24 MMOL/L
CREAT SERPL-MCNC: 0.75 MG/DL
EGFR: 113 ML/MIN/1.73M2
EOSINOPHIL # BLD AUTO: 0.09 K/UL
EOSINOPHIL NFR BLD AUTO: 1.7 %
ESTIMATED AVERAGE GLUCOSE: 97 MG/DL
FOLATE SERPL-MCNC: 19.8 NG/ML
GLUCOSE SERPL-MCNC: 79 MG/DL
HBA1C MFR BLD HPLC: 5 %
HCT VFR BLD CALC: 41.1 %
HDLC SERPL-MCNC: 76 MG/DL
HGB BLD-MCNC: 13.8 G/DL
IMM GRANULOCYTES NFR BLD AUTO: 0 %
LDLC SERPL CALC-MCNC: 91 MG/DL
LYMPHOCYTES # BLD AUTO: 1.8 K/UL
LYMPHOCYTES NFR BLD AUTO: 33.6 %
MAN DIFF?: NORMAL
MCHC RBC-ENTMCNC: 30.2 PG
MCHC RBC-ENTMCNC: 33.6 GM/DL
MCV RBC AUTO: 89.9 FL
MONOCYTES # BLD AUTO: 0.38 K/UL
MONOCYTES NFR BLD AUTO: 7.1 %
NEUTROPHILS # BLD AUTO: 3.04 K/UL
NEUTROPHILS NFR BLD AUTO: 56.7 %
NONHDLC SERPL-MCNC: 100 MG/DL
PLATELET # BLD AUTO: 281 K/UL
POTASSIUM SERPL-SCNC: 4.9 MMOL/L
PROT SERPL-MCNC: 7.1 G/DL
RBC # BLD: 4.57 M/UL
RBC # FLD: 12.6 %
SODIUM SERPL-SCNC: 140 MMOL/L
T4 FREE SERPL-MCNC: 1.3 NG/DL
TRIGL SERPL-MCNC: 48 MG/DL
TSH SERPL-ACNC: 1.54 UIU/ML
VIT B12 SERPL-MCNC: 1466 PG/ML
WBC # FLD AUTO: 5.36 K/UL

## 2024-06-14 LAB
APPEARANCE: CLEAR
BACTERIA: NEGATIVE /HPF
BILIRUBIN URINE: NEGATIVE
BLOOD URINE: NEGATIVE
C TRACH RRNA SPEC QL NAA+PROBE: NOT DETECTED
CAST: 0 /LPF
COLOR: YELLOW
EPITHELIAL CELLS: 2 /HPF
GLUCOSE QUALITATIVE U: NEGATIVE MG/DL
HAV IGM SER QL: NONREACTIVE
HBV CORE IGM SER QL: NONREACTIVE
HBV SURFACE AG SER QL: NONREACTIVE
HCV AB SER QL: NONREACTIVE
HCV S/CO RATIO: 0.15 S/CO
HIV1+2 AB SPEC QL IA.RAPID: NONREACTIVE
KETONES URINE: NEGATIVE MG/DL
LEUKOCYTE ESTERASE URINE: NEGATIVE
MICROSCOPIC-UA: NORMAL
N GONORRHOEA RRNA SPEC QL NAA+PROBE: NOT DETECTED
NITRITE URINE: NEGATIVE
PH URINE: 6.5
PROTEIN URINE: NEGATIVE MG/DL
RED BLOOD CELLS URINE: 1 /HPF
SOURCE AMPLIFICATION: NORMAL
SPECIFIC GRAVITY URINE: 1.02
T PALLIDUM AB SER QL IA: NEGATIVE
UROBILINOGEN URINE: 0.2 MG/DL
WHITE BLOOD CELLS URINE: 0 /HPF

## 2024-10-28 ENCOUNTER — APPOINTMENT (OUTPATIENT)
Dept: OBGYN | Facility: CLINIC | Age: 24
End: 2024-10-28

## 2024-10-28 VITALS
HEIGHT: 64 IN | OXYGEN SATURATION: 99 % | HEART RATE: 81 BPM | WEIGHT: 129 LBS | SYSTOLIC BLOOD PRESSURE: 120 MMHG | DIASTOLIC BLOOD PRESSURE: 77 MMHG | BODY MASS INDEX: 22.02 KG/M2

## 2024-10-28 DIAGNOSIS — Z30.019 ENCOUNTER FOR INITIAL PRESCRIPTION OF CONTRACEPTIVES, UNSPECIFIED: ICD-10-CM

## 2024-10-28 DIAGNOSIS — Z11.3 ENCOUNTER FOR SCREENING FOR INFECTIONS WITH A PREDOMINANTLY SEXUAL MODE OF TRANSMISSION: ICD-10-CM

## 2024-10-28 DIAGNOSIS — Z12.4 ENCOUNTER FOR SCREENING FOR MALIGNANT NEOPLASM OF CERVIX: ICD-10-CM

## 2024-10-28 PROCEDURE — 99385 PREV VISIT NEW AGE 18-39: CPT

## 2024-10-28 RX ORDER — NORETHINDRONE ACETATE AND ETHINYL ESTRADIOL AND FERROUS FUMARATE 1MG-20(24)
1-20 KIT ORAL DAILY
Qty: 3 | Refills: 3 | Status: ACTIVE | COMMUNITY
Start: 2024-10-28 | End: 1900-01-01

## 2024-11-05 LAB
C TRACH RRNA SPEC QL NAA+PROBE: NOT DETECTED
CYTOLOGY CVX/VAG DOC THIN PREP: NORMAL
HBV SURFACE AG SER QL: NONREACTIVE
HCV AB SER QL: NONREACTIVE
HCV S/CO RATIO: 0.14 S/CO
HIV1+2 AB SPEC QL IA.RAPID: NONREACTIVE
N GONORRHOEA RRNA SPEC QL NAA+PROBE: NOT DETECTED
SOURCE TP AMPLIFICATION: NORMAL
T PALLIDUM AB SER QL IA: NEGATIVE
T VAGINALIS RRNA SPEC QL NAA+PROBE: NOT DETECTED

## 2024-12-31 ENCOUNTER — OUTPATIENT (OUTPATIENT)
Dept: OUTPATIENT SERVICES | Facility: HOSPITAL | Age: 24
LOS: 1 days | End: 2024-12-31
Payer: COMMERCIAL

## 2024-12-31 ENCOUNTER — APPOINTMENT (OUTPATIENT)
Dept: MRI IMAGING | Facility: HOSPITAL | Age: 24
End: 2024-12-31

## 2024-12-31 PROCEDURE — 70553 MRI BRAIN STEM W/O & W/DYE: CPT

## 2024-12-31 PROCEDURE — A9585: CPT

## 2024-12-31 PROCEDURE — 70553 MRI BRAIN STEM W/O & W/DYE: CPT | Mod: 26,76

## 2025-01-06 ENCOUNTER — TRANSCRIPTION ENCOUNTER (OUTPATIENT)
Age: 25
End: 2025-01-06

## 2025-01-08 ENCOUNTER — APPOINTMENT (OUTPATIENT)
Dept: FAMILY MEDICINE | Facility: CLINIC | Age: 25
End: 2025-01-08
Payer: COMMERCIAL

## 2025-01-08 DIAGNOSIS — K64.9 UNSPECIFIED HEMORRHOIDS: ICD-10-CM

## 2025-01-08 PROCEDURE — 99213 OFFICE O/P EST LOW 20 MIN: CPT | Mod: 95

## 2025-01-08 RX ORDER — HYDROCORTISONE 25 MG/G
2.5 CREAM TOPICAL
Qty: 1 | Refills: 0 | Status: ACTIVE | COMMUNITY
Start: 2025-01-06 | End: 1900-01-01

## 2025-01-21 ENCOUNTER — APPOINTMENT (OUTPATIENT)
Dept: NEUROSURGERY | Facility: CLINIC | Age: 25
End: 2025-01-21
Payer: COMMERCIAL

## 2025-01-21 DIAGNOSIS — H71.90 UNSPECIFIED CHOLESTEATOMA, UNSPECIFIED EAR: ICD-10-CM

## 2025-01-21 DIAGNOSIS — E34.8 OTHER SPECIFIED ENDOCRINE DISORDERS: ICD-10-CM

## 2025-01-21 PROCEDURE — 99212 OFFICE O/P EST SF 10 MIN: CPT | Mod: 95

## 2025-06-25 NOTE — DIETITIAN INITIAL EVALUATION ADULT - NUTRITION DIAGNOSITC TERMINOLOGY #1
Problem: Discharge Planning  Goal: Discharge to home or other facility with appropriate resources  6/25/2025 1426 by Amor Thomas RN  Outcome: Progressing     Problem: Safety - Adult  Goal: Free from fall injury  6/25/2025 1426 by Amor Thomas RN  Outcome: Progressing     Problem: Pain  Goal: Verbalizes/displays adequate comfort level or baseline comfort level  6/25/2025 1426 by Amor Thomas RN  Outcome: Progressing      Food & Nutrition Related Knowledge Deficit

## 2025-09-08 ENCOUNTER — APPOINTMENT (OUTPATIENT)
Dept: NEUROLOGY | Facility: CLINIC | Age: 25
End: 2025-09-08

## (undated) DEVICE — GOWN XL

## (undated) DEVICE — XI STAPLER SUREFORM 60

## (undated) DEVICE — INSUFFLATION NDL COVIDIEN SURGINEEDLE VERESS 120MM

## (undated) DEVICE — XI VESSEL SEALER

## (undated) DEVICE — XI DRAPE COLUMN

## (undated) DEVICE — TROCAR COVIDIEN VERSAPORT BLADELESS OPTICAL 5MM STANDARD

## (undated) DEVICE — SUT MONOCRYL 4-0 27" PS-2 UNDYED

## (undated) DEVICE — BLADE SURGICAL #15 CARBON

## (undated) DEVICE — DRAPE 3/4 SHEET 52X76"

## (undated) DEVICE — DRSG GAUZE PACKTNER ROLL

## (undated) DEVICE — XI ENDOWRIST 12 - 8 MM CANNULA REDUCER

## (undated) DEVICE — XI DRAPE ARM

## (undated) DEVICE — SUT PDS II 2-0 27" SH

## (undated) DEVICE — VENODYNE/SCD SLEEVE CALF MEDIUM

## (undated) DEVICE — GLV 7.5 PROTEXIS (WHITE)

## (undated) DEVICE — TROCAR COVIDIEN VERSAPORT BLADELESS OPTICAL 12MM STANDARD

## (undated) DEVICE — TUBING STRYKER PNEUMOCLEAR HIGH FLOW

## (undated) DEVICE — D HELP - CLEARVIEW CLEARIFY SYSTEM

## (undated) DEVICE — XI 12MM AND STAPLER CANNULA SEAL

## (undated) DEVICE — XI SEAL UNIVERSIAL 5-12MM

## (undated) DEVICE — DRSG DERMABOND 0.7ML

## (undated) DEVICE — WARMING BLANKET UPPER ADULT

## (undated) DEVICE — XI SEAL UNIV 5- 8 MM

## (undated) DEVICE — DRAPE 1/2 SHEET 40X57"

## (undated) DEVICE — SYR LUER LOK 30CC

## (undated) DEVICE — PACK GENERAL LAPAROSCOPY